# Patient Record
Sex: FEMALE | Race: OTHER | HISPANIC OR LATINO | URBAN - METROPOLITAN AREA
[De-identification: names, ages, dates, MRNs, and addresses within clinical notes are randomized per-mention and may not be internally consistent; named-entity substitution may affect disease eponyms.]

---

## 2017-04-01 ENCOUNTER — OUTPATIENT (OUTPATIENT)
Dept: OUTPATIENT SERVICES | Facility: HOSPITAL | Age: 62
LOS: 1 days | End: 2017-04-01
Payer: MEDICAID

## 2017-04-01 DIAGNOSIS — Z98.89 OTHER SPECIFIED POSTPROCEDURAL STATES: Chronic | ICD-10-CM

## 2017-04-10 DIAGNOSIS — R69 ILLNESS, UNSPECIFIED: ICD-10-CM

## 2017-05-01 PROCEDURE — G9001: CPT

## 2017-05-19 ENCOUNTER — APPOINTMENT (OUTPATIENT)
Dept: ORTHOPEDIC SURGERY | Facility: CLINIC | Age: 62
End: 2017-05-19

## 2017-08-07 ENCOUNTER — APPOINTMENT (OUTPATIENT)
Dept: ORTHOPEDIC SURGERY | Facility: CLINIC | Age: 62
End: 2017-08-07

## 2017-09-20 ENCOUNTER — APPOINTMENT (OUTPATIENT)
Dept: ORTHOPEDIC SURGERY | Facility: CLINIC | Age: 62
End: 2017-09-20
Payer: MEDICAID

## 2017-09-20 VITALS
SYSTOLIC BLOOD PRESSURE: 98 MMHG | BODY MASS INDEX: 45.75 KG/M2 | HEART RATE: 112 BPM | DIASTOLIC BLOOD PRESSURE: 70 MMHG | WEIGHT: 233 LBS | HEIGHT: 60 IN | TEMPERATURE: 97.7 F

## 2017-09-20 PROCEDURE — 99214 OFFICE O/P EST MOD 30 MIN: CPT

## 2017-10-04 ENCOUNTER — APPOINTMENT (OUTPATIENT)
Dept: MRI IMAGING | Facility: CLINIC | Age: 62
End: 2017-10-04
Payer: MEDICAID

## 2017-10-04 ENCOUNTER — OUTPATIENT (OUTPATIENT)
Dept: OUTPATIENT SERVICES | Facility: HOSPITAL | Age: 62
LOS: 1 days | End: 2017-10-04

## 2017-10-04 DIAGNOSIS — Z98.89 OTHER SPECIFIED POSTPROCEDURAL STATES: Chronic | ICD-10-CM

## 2017-10-04 DIAGNOSIS — M76.61 ACHILLES TENDINITIS, RIGHT LEG: ICD-10-CM

## 2017-10-04 PROCEDURE — 73721 MRI JNT OF LWR EXTRE W/O DYE: CPT | Mod: 26,RT

## 2017-10-09 ENCOUNTER — APPOINTMENT (OUTPATIENT)
Dept: ORTHOPEDIC SURGERY | Facility: CLINIC | Age: 62
End: 2017-10-09
Payer: MEDICAID

## 2017-10-09 PROCEDURE — 99214 OFFICE O/P EST MOD 30 MIN: CPT

## 2018-06-29 ENCOUNTER — APPOINTMENT (OUTPATIENT)
Dept: ORTHOPEDIC SURGERY | Facility: CLINIC | Age: 63
End: 2018-06-29
Payer: MEDICAID

## 2018-06-29 ENCOUNTER — RX RENEWAL (OUTPATIENT)
Age: 63
End: 2018-06-29

## 2018-06-29 DIAGNOSIS — M76.62 ACHILLES TENDINITIS, LEFT LEG: ICD-10-CM

## 2018-06-29 DIAGNOSIS — M72.2 PLANTAR FASCIAL FIBROMATOSIS: ICD-10-CM

## 2018-06-29 DIAGNOSIS — M76.61 ACHILLES TENDINITIS, RIGHT LEG: ICD-10-CM

## 2018-06-29 PROCEDURE — 99214 OFFICE O/P EST MOD 30 MIN: CPT

## 2018-07-10 ENCOUNTER — APPOINTMENT (OUTPATIENT)
Dept: RHEUMATOLOGY | Facility: CLINIC | Age: 63
End: 2018-07-10

## 2018-09-20 ENCOUNTER — INPATIENT (INPATIENT)
Facility: HOSPITAL | Age: 63
LOS: 2 days | Discharge: HOME | DRG: 392 | End: 2018-09-23
Attending: HOSPITALIST | Admitting: INTERNAL MEDICINE
Payer: COMMERCIAL

## 2018-09-20 VITALS
OXYGEN SATURATION: 88 % | HEART RATE: 88 BPM | TEMPERATURE: 99 F | RESPIRATION RATE: 16 BRPM | DIASTOLIC BLOOD PRESSURE: 55 MMHG | SYSTOLIC BLOOD PRESSURE: 96 MMHG

## 2018-09-20 DIAGNOSIS — R09.02 HYPOXEMIA: ICD-10-CM

## 2018-09-20 DIAGNOSIS — N17.9 ACUTE KIDNEY FAILURE, UNSPECIFIED: ICD-10-CM

## 2018-09-20 DIAGNOSIS — N19 UNSPECIFIED KIDNEY FAILURE: ICD-10-CM

## 2018-09-20 DIAGNOSIS — R09.89 OTHER SPECIFIED SYMPTOMS AND SIGNS INVOLVING THE CIRCULATORY AND RESPIRATORY SYSTEMS: ICD-10-CM

## 2018-09-20 DIAGNOSIS — F25.0 SCHIZOAFFECTIVE DISORDER, BIPOLAR TYPE: ICD-10-CM

## 2018-09-20 DIAGNOSIS — Z98.89 OTHER SPECIFIED POSTPROCEDURAL STATES: Chronic | ICD-10-CM

## 2018-09-20 DIAGNOSIS — E11.9 TYPE 2 DIABETES MELLITUS WITHOUT COMPLICATIONS: ICD-10-CM

## 2018-09-20 DIAGNOSIS — I10 ESSENTIAL (PRIMARY) HYPERTENSION: ICD-10-CM

## 2018-09-20 LAB
ALBUMIN SERPL ELPH-MCNC: 3.6 G/DL — SIGNIFICANT CHANGE UP (ref 3.3–5.2)
ALP SERPL-CCNC: 141 U/L — HIGH (ref 40–120)
ALT FLD-CCNC: 33 U/L — HIGH
AMPHET UR-MCNC: NEGATIVE — SIGNIFICANT CHANGE UP
ANION GAP SERPL CALC-SCNC: 16 MMOL/L — SIGNIFICANT CHANGE UP (ref 5–17)
APPEARANCE UR: ABNORMAL
AST SERPL-CCNC: 45 U/L — HIGH
BACTERIA # UR AUTO: ABNORMAL
BARBITURATES UR SCN-MCNC: NEGATIVE — SIGNIFICANT CHANGE UP
BENZODIAZ UR-MCNC: NEGATIVE — SIGNIFICANT CHANGE UP
BILIRUB SERPL-MCNC: 0.3 MG/DL — LOW (ref 0.4–2)
BILIRUB UR-MCNC: ABNORMAL
BUN SERPL-MCNC: 40 MG/DL — HIGH (ref 8–20)
CALCIUM SERPL-MCNC: 9.4 MG/DL — SIGNIFICANT CHANGE UP (ref 8.6–10.2)
CHLORIDE SERPL-SCNC: 98 MMOL/L — SIGNIFICANT CHANGE UP (ref 98–107)
CK MB CFR SERPL CALC: 8.4 NG/ML — HIGH (ref 0–6.7)
CK SERPL-CCNC: 1106 U/L — HIGH (ref 25–170)
CO2 SERPL-SCNC: 23 MMOL/L — SIGNIFICANT CHANGE UP (ref 22–29)
COCAINE METAB.OTHER UR-MCNC: NEGATIVE — SIGNIFICANT CHANGE UP
COLOR SPEC: YELLOW — SIGNIFICANT CHANGE UP
CREAT SERPL-MCNC: 4.1 MG/DL — HIGH (ref 0.5–1.3)
D DIMER BLD IA.RAPID-MCNC: 1011 NG/ML DDU — HIGH
DIFF PNL FLD: ABNORMAL
EPI CELLS # UR: SIGNIFICANT CHANGE UP
GLUCOSE BLDC GLUCOMTR-MCNC: 131 MG/DL — HIGH (ref 70–99)
GLUCOSE SERPL-MCNC: 116 MG/DL — HIGH (ref 70–115)
GLUCOSE UR QL: NEGATIVE MG/DL — SIGNIFICANT CHANGE UP
HCT VFR BLD CALC: 35.8 % — LOW (ref 37–47)
HGB BLD-MCNC: 11.6 G/DL — LOW (ref 12–16)
HYALINE CASTS # UR AUTO: ABNORMAL /LPF
KETONES UR-MCNC: ABNORMAL
LEUKOCYTE ESTERASE UR-ACNC: ABNORMAL
LIDOCAIN IGE QN: 10 U/L — LOW (ref 22–51)
MCHC RBC-ENTMCNC: 29 PG — SIGNIFICANT CHANGE UP (ref 27–31)
MCHC RBC-ENTMCNC: 32.4 G/DL — SIGNIFICANT CHANGE UP (ref 32–36)
MCV RBC AUTO: 89.5 FL — SIGNIFICANT CHANGE UP (ref 81–99)
METHADONE UR-MCNC: NEGATIVE — SIGNIFICANT CHANGE UP
NITRITE UR-MCNC: NEGATIVE — SIGNIFICANT CHANGE UP
OPIATES UR-MCNC: NEGATIVE — SIGNIFICANT CHANGE UP
PCP SPEC-MCNC: SIGNIFICANT CHANGE UP
PCP UR-MCNC: NEGATIVE — SIGNIFICANT CHANGE UP
PH UR: 5 — SIGNIFICANT CHANGE UP (ref 5–8)
PLATELET # BLD AUTO: 284 K/UL — SIGNIFICANT CHANGE UP (ref 150–400)
POTASSIUM SERPL-MCNC: 4.5 MMOL/L — SIGNIFICANT CHANGE UP (ref 3.5–5.3)
POTASSIUM SERPL-SCNC: 4.5 MMOL/L — SIGNIFICANT CHANGE UP (ref 3.5–5.3)
PROT SERPL-MCNC: 7.1 G/DL — SIGNIFICANT CHANGE UP (ref 6.6–8.7)
PROT UR-MCNC: 30 MG/DL
RBC # BLD: 4 M/UL — LOW (ref 4.4–5.2)
RBC # FLD: 14.4 % — SIGNIFICANT CHANGE UP (ref 11–15.6)
RBC CASTS # UR COMP ASSIST: ABNORMAL /HPF (ref 0–4)
SODIUM SERPL-SCNC: 137 MMOL/L — SIGNIFICANT CHANGE UP (ref 135–145)
SP GR SPEC: 1.01 — SIGNIFICANT CHANGE UP (ref 1.01–1.02)
THC UR QL: NEGATIVE — SIGNIFICANT CHANGE UP
TROPONIN T SERPL-MCNC: <0.01 NG/ML — SIGNIFICANT CHANGE UP (ref 0–0.06)
UROBILINOGEN FLD QL: 4 MG/DL
WBC # BLD: 12.5 K/UL — HIGH (ref 4.8–10.8)
WBC # FLD AUTO: 12.5 K/UL — HIGH (ref 4.8–10.8)
WBC UR QL: ABNORMAL

## 2018-09-20 PROCEDURE — 78582 LUNG VENTILAT&PERFUS IMAGING: CPT | Mod: 26

## 2018-09-20 PROCEDURE — 71045 X-RAY EXAM CHEST 1 VIEW: CPT | Mod: 26

## 2018-09-20 PROCEDURE — 76775 US EXAM ABDO BACK WALL LIM: CPT | Mod: 26

## 2018-09-20 PROCEDURE — 99285 EMERGENCY DEPT VISIT HI MDM: CPT

## 2018-09-20 PROCEDURE — 99223 1ST HOSP IP/OBS HIGH 75: CPT

## 2018-09-20 PROCEDURE — 93010 ELECTROCARDIOGRAM REPORT: CPT

## 2018-09-20 PROCEDURE — 93970 EXTREMITY STUDY: CPT | Mod: 26

## 2018-09-20 RX ORDER — DEXTROSE 50 % IN WATER 50 %
15 SYRINGE (ML) INTRAVENOUS ONCE
Qty: 0 | Refills: 0 | Status: DISCONTINUED | OUTPATIENT
Start: 2018-09-20 | End: 2018-09-23

## 2018-09-20 RX ORDER — ALBUTEROL 90 UG/1
2.5 AEROSOL, METERED ORAL EVERY 6 HOURS
Qty: 0 | Refills: 0 | Status: DISCONTINUED | OUTPATIENT
Start: 2018-09-20 | End: 2018-09-23

## 2018-09-20 RX ORDER — DEXTROSE 50 % IN WATER 50 %
12.5 SYRINGE (ML) INTRAVENOUS ONCE
Qty: 0 | Refills: 0 | Status: DISCONTINUED | OUTPATIENT
Start: 2018-09-20 | End: 2018-09-23

## 2018-09-20 RX ORDER — INSULIN LISPRO 100/ML
VIAL (ML) SUBCUTANEOUS
Qty: 0 | Refills: 0 | Status: DISCONTINUED | OUTPATIENT
Start: 2018-09-20 | End: 2018-09-23

## 2018-09-20 RX ORDER — SODIUM CHLORIDE 9 MG/ML
1000 INJECTION INTRAMUSCULAR; INTRAVENOUS; SUBCUTANEOUS
Qty: 0 | Refills: 0 | Status: DISCONTINUED | OUTPATIENT
Start: 2018-09-20 | End: 2018-09-22

## 2018-09-20 RX ORDER — GLUCAGON INJECTION, SOLUTION 0.5 MG/.1ML
1 INJECTION, SOLUTION SUBCUTANEOUS ONCE
Qty: 0 | Refills: 0 | Status: DISCONTINUED | OUTPATIENT
Start: 2018-09-20 | End: 2018-09-23

## 2018-09-20 RX ORDER — SODIUM CHLORIDE 9 MG/ML
3 INJECTION INTRAMUSCULAR; INTRAVENOUS; SUBCUTANEOUS ONCE
Qty: 0 | Refills: 0 | Status: COMPLETED | OUTPATIENT
Start: 2018-09-20 | End: 2018-09-20

## 2018-09-20 RX ORDER — DEXTROSE 50 % IN WATER 50 %
25 SYRINGE (ML) INTRAVENOUS ONCE
Qty: 0 | Refills: 0 | Status: DISCONTINUED | OUTPATIENT
Start: 2018-09-20 | End: 2018-09-23

## 2018-09-20 RX ORDER — AMLODIPINE BESYLATE 2.5 MG/1
2.5 TABLET ORAL DAILY
Qty: 0 | Refills: 0 | Status: DISCONTINUED | OUTPATIENT
Start: 2018-09-20 | End: 2018-09-23

## 2018-09-20 RX ORDER — SODIUM CHLORIDE 9 MG/ML
1000 INJECTION, SOLUTION INTRAVENOUS
Qty: 0 | Refills: 0 | Status: DISCONTINUED | OUTPATIENT
Start: 2018-09-20 | End: 2018-09-23

## 2018-09-20 RX ORDER — HEPARIN SODIUM 5000 [USP'U]/ML
5000 INJECTION INTRAVENOUS; SUBCUTANEOUS EVERY 8 HOURS
Qty: 0 | Refills: 0 | Status: DISCONTINUED | OUTPATIENT
Start: 2018-09-20 | End: 2018-09-23

## 2018-09-20 RX ADMIN — SODIUM CHLORIDE 3 MILLILITER(S): 9 INJECTION INTRAMUSCULAR; INTRAVENOUS; SUBCUTANEOUS at 14:22

## 2018-09-20 RX ADMIN — SODIUM CHLORIDE 100 MILLILITER(S): 9 INJECTION INTRAMUSCULAR; INTRAVENOUS; SUBCUTANEOUS at 22:12

## 2018-09-20 RX ADMIN — HEPARIN SODIUM 5000 UNIT(S): 5000 INJECTION INTRAVENOUS; SUBCUTANEOUS at 22:16

## 2018-09-20 NOTE — H&P ADULT - HISTORY OF PRESENT ILLNESS
62 years old female with PMH of HTN, DM and schizoaffective disorder brought in to the ER for the evaluation of low O2 sat. Reportedly patient was not feeling well for the past several days. She called EMS who found her with low O2 sat and was transported to the ER. Patient reports seeing people in her house. She is poor historian and is unable to provide any more information. Denies any fever, chills, nausea, vomiting, chest pain, SOB, diarrhea or constipation. 62 years old female with PMH of HTN, DM and schizoaffective disorder brought in to the ER for the evaluation of low O2 sat. Reportedly patient was not feeling well for the past several days. Her   visiting nurse noticed that she is more confused than her baseline. She called EMS who found her with low O2 sat (88%) and was transported to the ER. Patient reports seeing people in her house. She is poor historian and is unable to provide any more information. Denies any fever, chills, nausea, vomiting, chest pain, SOB, diarrhea or constipation.

## 2018-09-20 NOTE — ED PROVIDER NOTE - OBJECTIVE STATEMENT
63 yo female, PMH schizoaffective disorder bipolar type, diabetes, and hypertension is BIBEMS for low oxygen saturation. Patient states that she called EMS because she was "feeling sick." Patient cannot give more information about her condition or the factors that led her to call EMS.   As per patient's emergency contact who was reached by phone: He states that he saw the patient x5 days ago and she was upset due to the recent loss of her parent.

## 2018-09-20 NOTE — ED ADULT NURSE NOTE - NSIMPLEMENTINTERV_GEN_ALL_ED
Implemented All Fall with Harm Risk Interventions:  [place] to call system. Call bell, personal items and telephone within reach. Instruct patient to call for assistance. Room bathroom lighting operational. Non-slip footwear when patient is off stretcher. Physically safe environment: no spills, clutter or unnecessary equipment. Stretcher in lowest position, wheels locked, appropriate side rails in place. Provide visual cue, wrist band, yellow gown, etc. Monitor gait and stability. Monitor for mental status changes and reorient to person, place, and time. Review medications for side effects contributing to fall risk. Reinforce activity limits and safety measures with patient and family. Provide visual clues: red socks.

## 2018-09-20 NOTE — ED ADULT NURSE NOTE - OBJECTIVE STATEMENT
62 year old female a&ox2, disoriented to date. Patient states she came to hospital because " she was sick " unable to answer questions appropriately.

## 2018-09-20 NOTE — ED PROVIDER NOTE - MEDICAL DECISION MAKING DETAILS
63 yo F, PMH DM, HTN, schizoaffective disorder, BIBEMS d/t low oxygen saturation. P/w AMS. Will complete w/u, including CBC, CXR, UA, tox, then reassess.

## 2018-09-20 NOTE — ED ADULT TRIAGE NOTE - CHIEF COMPLAINT QUOTE
pt biba from home, ems was called by visiting nurse for increased altered mental status, pt is awake but disoriented, difficulty to ascertain information, poor hygiene, no distress, voices no complaints, 88% on RA

## 2018-09-20 NOTE — ED PROVIDER NOTE - PROGRESS NOTE DETAILS
EVAL SIG D D MEGHANA OVER 1000. VQ SCAN ORDERED BC BUN AND CRE ELEVATED TOO MUCH TO DO CTA. PT STATES DR MENON IN [facility] TOLD HER SHE HAD FLUID ON HER LUNGS

## 2018-09-20 NOTE — H&P ADULT - RS GEN PE MLT RESP DETAILS PC
respirations non-labored/good air movement/diminished breath sounds, L/breath sounds equal/diminished breath sounds, R/no rhonchi/no rales

## 2018-09-20 NOTE — H&P ADULT - PROBLEM SELECTOR PLAN 3
BP in normal range.  patient doesn't know her medications and no family member around.  started on low dose norvasc.  monitor BP.

## 2018-09-20 NOTE — ED ADULT NURSE REASSESSMENT NOTE - NS ED NURSE REASSESS COMMENT FT1
patient awake, alert, patient remains disoriented to why she is here but knows where she is. patient informed on plan of care. will continue to monitor.

## 2018-09-20 NOTE — ED PROVIDER NOTE - CONSTITUTIONAL, MLM
normal... (+)Obese.  Awake, but seems tired. Not cooperative with exam, does not answer questions appropriately.

## 2018-09-20 NOTE — CONSULT NOTE ADULT - SUBJECTIVE AND OBJECTIVE BOX
Patient is a 62y old  Female who presents with a chief complaint of  " feeling ill ".    HPI:   Pt felt ill and came to ER she denies knowledge of any prior renal problems but does have dysuria recently. Hx of sleep apnea and uses 02 at home she states. Prior cardiac stent .    PAST MEDICAL & SURGICAL HISTORY:  Diabetes  Hypertension  Schizoaffective disorder, bipolar type  H/O cardiac catheterization: radially accessed  S/P    DM 2, MO, hypothyroidism, prior DVT and IVC filter; Cholecystectomy    FAMILY HISTORY:  No pertinent family history in first degree relatives  [place]    Social History: Prior smoker.    MEDICATIONS  (STANDING):    MEDICATIONS  (PRN):   Meds reviewed    Allergies    No Known Allergies          REVIEW OF SYSTEMS:    CONSTITUTIONAL:   feels weak  EYES: No eye pain, visual disturbances, or discharge  ENMT:  No difficulty hearing, tinnitus, vertigo; No sinus or throat pain  NECK: No pain or stiffness  BREASTS: No pain, masses, or nipple discharge  RESPIRATORY: sob  CARDIOVASCULAR: No chest pain, palpitations, dizziness,   GASTROINTESTINAL: No abdominal or epigastric pain. No nausea, vomiting, or hematemesis; No diarrhea or constipation.   GENITOURINARY: + dysuria, frequency,  no hematuria, or incontinence  NEUROLOGICAL: No headaches, memory loss, loss of strength, numbness, or tremors  SKIN: Neg  LYMPH NODES: No enlarged glands  ENDOCRINE: No heat or cold intolerance; No hair loss  MUSCULOSKELETAL: Neg  PSYCHIATRIC: No depression, anxiety, mood swings, or difficulty sleeping  HEME/LYMPH: No easy bruising, or bleeding gums  ALLERY AND IMMUNOLOGIC: No hives or eczema      Vital Signs Last 24 Hrs  T(C): 37.1 (20 Sep 2018 17:36), Max: 37.1 (20 Sep 2018 11:45)  T(F): 98.8 (20 Sep 2018 17:36), Max: 98.8 (20 Sep 2018 11:45)  HR: 84 (20 Sep 2018 17:36) (84 - 88)  BP: 114/68 (20 Sep 2018 17:36) (96/55 - 114/68)  BP(mean): --  RR: 18 (20 Sep 2018 17:36) (16 - 18)  SpO2: 97% (20 Sep 2018 17:36) (88% - 97%)       PHYSICAL EXAM:    GENERAL: appears chronically ill, oriented.  HEAD:  Atraumatic, Normocephalic  EYES: EOMI, PERRLA, conjunctiva and sclera clear  ENMT: No tonsillar erythema, exudates, or enlargement; Moist mucous membranes,   NECK: Supple, neck  veins not full  NERVOUS SYSTEM:  Alert & Oriented , ; Motor Strength wnl upper and lower extremities;   CHEST/LUNG: Clear to percussion bilaterally; No rales, rhonchi, wheezing, or rubs  HEART: Regular rate and rhythm; No murmurs, rubs, or gallops  ABDOMEN: Soft, Nontender, Nondistended; Bowel sounds present, obese , scars lower  EXTREMITIES:  Obese  LYMPH: No lymphadenopathy noted  SKIN: No rashes or lesions, pale   neg    LABS:                        11.6   12.5  )-----------( 284      ( 20 Sep 2018 14:33 )             35.8         137  |  98  |  40.0<H>  ----------------------------<  116<H>  4.5   |  23.0  |  4.10<H>    Ca    9.4      20 Sep 2018 14:33    TPro  7.1  /  Alb  3.6  /  TBili  0.3<L>  /  DBili  x   /  AST  45<H>  /  ALT  33<H>  /  AlkPhos  141<H>        Urinalysis Basic - ( 20 Sep 2018 17:43 )    Color: Yellow / Appearance: Cloudy / S.015 / pH: x  Gluc: x / Ketone: Trace  / Bili: Small / Urobili: 4 mg/dL   Blood: x / Protein: 30 mg/dL / Nitrite: Negative   Leuk Esterase: Moderate / RBC: 11-25 /HPF / WBC 11-25   Sq Epi: x / Non Sq Epi: Occasional / Bacteria: Moderate              RADIOLOGY & ADDITIONAL TESTS:

## 2018-09-20 NOTE — H&P ADULT - PROBLEM SELECTOR PLAN 1
baseline cr not known.  renal US ordered.  started on NS at 100 ml/hr.  renally dosed medications.  follow up BMP in am  nephrology consult

## 2018-09-20 NOTE — H&P ADULT - PROBLEM SELECTOR PLAN 2
current O2 sat >90 on 2 L  ABG pending.  elevated d-dimers.  VQ scan low probability   lower extremity venous doppler pending.  prn albuterol  titrate fio2 to sat of 90

## 2018-09-20 NOTE — H&P ADULT - PROBLEM SELECTOR PLAN 5
pt doesn't know her medications.  psych consult called.  day time attending to get the list of medications from .

## 2018-09-20 NOTE — ED ADULT NURSE REASSESSMENT NOTE - NS ED NURSE REASSESS COMMENT FT1
Pt in no acute distress, cooperative but requires frequent redirection to hold still during IV placement, very dry mucous membranes noted.

## 2018-09-20 NOTE — H&P ADULT - ASSESSMENT
62 years old female with PMH of HTN, DM and schizoaffective disorder brought in to the ER for the evaluation of low O2 sat. Reportedly patient was not feeling well for the past several days. She called EMS who found her with low O2 sat and was transported to the ER. Patient reports seeing people in her house. She is poor historian and is unable to provide any more information. Denies any fever, chills, nausea, vomiting, chest pain, SOB, diarrhea or constipation.

## 2018-09-21 LAB
ALBUMIN SERPL ELPH-MCNC: 2.7 G/DL — LOW (ref 3.3–5.2)
ALP SERPL-CCNC: 157 U/L — HIGH (ref 40–120)
ALT FLD-CCNC: 33 U/L — HIGH
ANION GAP SERPL CALC-SCNC: 14 MMOL/L — SIGNIFICANT CHANGE UP (ref 5–17)
AST SERPL-CCNC: 48 U/L — HIGH
BILIRUB SERPL-MCNC: 0.3 MG/DL — LOW (ref 0.4–2)
BUN SERPL-MCNC: 33 MG/DL — HIGH (ref 8–20)
CALCIUM SERPL-MCNC: 9.3 MG/DL — SIGNIFICANT CHANGE UP (ref 8.6–10.2)
CHLORIDE SERPL-SCNC: 100 MMOL/L — SIGNIFICANT CHANGE UP (ref 98–107)
CK MB CFR SERPL CALC: 6.4 NG/ML — SIGNIFICANT CHANGE UP (ref 0–6.7)
CK SERPL-CCNC: 924 U/L — HIGH (ref 25–170)
CO2 SERPL-SCNC: 21 MMOL/L — LOW (ref 22–29)
CREAT SERPL-MCNC: 2.15 MG/DL — HIGH (ref 0.5–1.3)
GLUCOSE BLDC GLUCOMTR-MCNC: 102 MG/DL — HIGH (ref 70–99)
GLUCOSE BLDC GLUCOMTR-MCNC: 139 MG/DL — HIGH (ref 70–99)
GLUCOSE BLDC GLUCOMTR-MCNC: 98 MG/DL — SIGNIFICANT CHANGE UP (ref 70–99)
GLUCOSE SERPL-MCNC: 98 MG/DL — SIGNIFICANT CHANGE UP (ref 70–115)
HBA1C BLD-MCNC: 5.6 % — SIGNIFICANT CHANGE UP (ref 4–5.6)
LITHIUM SERPL-MCNC: <.1 MMOL/L — LOW (ref 0.5–1.5)
PHOSPHATE SERPL-MCNC: 3.3 MG/DL — SIGNIFICANT CHANGE UP (ref 2.4–4.7)
POTASSIUM SERPL-MCNC: 4.8 MMOL/L — SIGNIFICANT CHANGE UP (ref 3.5–5.3)
POTASSIUM SERPL-SCNC: 4.8 MMOL/L — SIGNIFICANT CHANGE UP (ref 3.5–5.3)
PROT SERPL-MCNC: 7.2 G/DL — SIGNIFICANT CHANGE UP (ref 6.6–8.7)
PTH-INTACT FLD-MCNC: 32 PG/ML — SIGNIFICANT CHANGE UP (ref 15–65)
SODIUM SERPL-SCNC: 135 MMOL/L — SIGNIFICANT CHANGE UP (ref 135–145)

## 2018-09-21 PROCEDURE — 99233 SBSQ HOSP IP/OBS HIGH 50: CPT

## 2018-09-21 PROCEDURE — 99223 1ST HOSP IP/OBS HIGH 75: CPT

## 2018-09-21 RX ADMIN — HEPARIN SODIUM 5000 UNIT(S): 5000 INJECTION INTRAVENOUS; SUBCUTANEOUS at 22:45

## 2018-09-21 RX ADMIN — AMLODIPINE BESYLATE 2.5 MILLIGRAM(S): 2.5 TABLET ORAL at 06:08

## 2018-09-21 RX ADMIN — HEPARIN SODIUM 5000 UNIT(S): 5000 INJECTION INTRAVENOUS; SUBCUTANEOUS at 13:21

## 2018-09-21 RX ADMIN — HEPARIN SODIUM 5000 UNIT(S): 5000 INJECTION INTRAVENOUS; SUBCUTANEOUS at 06:08

## 2018-09-21 NOTE — BEHAVIORAL HEALTH ASSESSMENT NOTE - HPI (INCLUDE ILLNESS QUALITY, SEVERITY, DURATION, TIMING, CONTEXT, MODIFYING FACTORS, ASSOCIATED SIGNS AND SYMPTOMS)
62 years old female with PMH of HTN, DM and schizoaffective disorder admitted with AMS and low O2 sat. Pt has long chronic hx of schizophrenia for years. She is followed by the [facility] ACT team [facility] for at least 8 years with no hospitalizations during that time. She lives alone and has been able to care for herself.   Patient reports compliance with medications; no change in sleeping or eating pattern. She does endorse VH of a man in a cape in her home; also feels someone is pushing her at times, however, is able to self soothe and knows it is not real. She feels like a "thunderstorm is hitting me" and she doesn't know what is wrong physically. She speaks circumstantially and tangentially. She is well oriented and denies SI/HI/AH. She reports that these symptoms have been persistent for years despite medication. She also expresses mild depression due to current circumstances.   TC to ACT team west for medication reconciliation and evaluation of patients baseline; message left for , awaiting return call from RN.

## 2018-09-21 NOTE — BEHAVIORAL HEALTH ASSESSMENT NOTE - SUMMARY
62f admitted for low O2 sat; chronic hx of schizophrenia and followed by [facility] ACT team for at least 8 yrs, maintained at home. No si/hi but experiencing VH when at home, which appears to be chronic symptom. Pt has been compliant and cooperative.     Plan:  1) awaiting return call from [facility] to reconcile medications and discuss baseline functioning

## 2018-09-21 NOTE — BEHAVIORAL HEALTH ASSESSMENT NOTE - RISK ASSESSMENT
moderate - pt without si/hi, has good community support without recent hospitalization, however, suffers chronic mental illness and having acute medical issue

## 2018-09-22 LAB
-  AMIKACIN: SIGNIFICANT CHANGE UP
-  AMPICILLIN/SULBACTAM: SIGNIFICANT CHANGE UP
-  AMPICILLIN: SIGNIFICANT CHANGE UP
-  AZTREONAM: SIGNIFICANT CHANGE UP
-  CEFAZOLIN: SIGNIFICANT CHANGE UP
-  CEFEPIME: SIGNIFICANT CHANGE UP
-  CEFOXITIN: SIGNIFICANT CHANGE UP
-  CEFTRIAXONE: SIGNIFICANT CHANGE UP
-  CIPROFLOXACIN: SIGNIFICANT CHANGE UP
-  ERTAPENEM: SIGNIFICANT CHANGE UP
-  GENTAMICIN: SIGNIFICANT CHANGE UP
-  IMIPENEM: SIGNIFICANT CHANGE UP
-  LEVOFLOXACIN: SIGNIFICANT CHANGE UP
-  MEROPENEM: SIGNIFICANT CHANGE UP
-  NITROFURANTOIN: SIGNIFICANT CHANGE UP
-  PIPERACILLIN/TAZOBACTAM: SIGNIFICANT CHANGE UP
-  TIGECYCLINE: SIGNIFICANT CHANGE UP
-  TOBRAMYCIN: SIGNIFICANT CHANGE UP
-  TRIMETHOPRIM/SULFAMETHOXAZOLE: SIGNIFICANT CHANGE UP
ALBUMIN SERPL ELPH-MCNC: 3.3 G/DL — SIGNIFICANT CHANGE UP (ref 3.3–5.2)
ALP SERPL-CCNC: 139 U/L — HIGH (ref 40–120)
ALT FLD-CCNC: 35 U/L — HIGH
ANION GAP SERPL CALC-SCNC: 11 MMOL/L — SIGNIFICANT CHANGE UP (ref 5–17)
AST SERPL-CCNC: 38 U/L — HIGH
BILIRUB SERPL-MCNC: 0.3 MG/DL — LOW (ref 0.4–2)
BUN SERPL-MCNC: 20 MG/DL — SIGNIFICANT CHANGE UP (ref 8–20)
CALCIUM SERPL-MCNC: 8.9 MG/DL — SIGNIFICANT CHANGE UP (ref 8.4–10.5)
CALCIUM SERPL-MCNC: 9.1 MG/DL — SIGNIFICANT CHANGE UP (ref 8.6–10.2)
CHLORIDE SERPL-SCNC: 100 MMOL/L — SIGNIFICANT CHANGE UP (ref 98–107)
CO2 SERPL-SCNC: 25 MMOL/L — SIGNIFICANT CHANGE UP (ref 22–29)
CREAT SERPL-MCNC: 1.05 MG/DL — SIGNIFICANT CHANGE UP (ref 0.5–1.3)
CULTURE RESULTS: SIGNIFICANT CHANGE UP
GLUCOSE BLDC GLUCOMTR-MCNC: 109 MG/DL — HIGH (ref 70–99)
GLUCOSE BLDC GLUCOMTR-MCNC: 119 MG/DL — HIGH (ref 70–99)
GLUCOSE BLDC GLUCOMTR-MCNC: 125 MG/DL — HIGH (ref 70–99)
GLUCOSE BLDC GLUCOMTR-MCNC: 128 MG/DL — HIGH (ref 70–99)
GLUCOSE SERPL-MCNC: 105 MG/DL — SIGNIFICANT CHANGE UP (ref 70–115)
HCT VFR BLD CALC: 36.2 % — LOW (ref 37–47)
HGB BLD-MCNC: 11.6 G/DL — LOW (ref 12–16)
MCHC RBC-ENTMCNC: 28.7 PG — SIGNIFICANT CHANGE UP (ref 27–31)
MCHC RBC-ENTMCNC: 32 G/DL — SIGNIFICANT CHANGE UP (ref 32–36)
MCV RBC AUTO: 89.6 FL — SIGNIFICANT CHANGE UP (ref 81–99)
METHOD TYPE: SIGNIFICANT CHANGE UP
ORGANISM # SPEC MICROSCOPIC CNT: SIGNIFICANT CHANGE UP
ORGANISM # SPEC MICROSCOPIC CNT: SIGNIFICANT CHANGE UP
PLATELET # BLD AUTO: 314 K/UL — SIGNIFICANT CHANGE UP (ref 150–400)
POTASSIUM SERPL-MCNC: 4.7 MMOL/L — SIGNIFICANT CHANGE UP (ref 3.5–5.3)
POTASSIUM SERPL-SCNC: 4.7 MMOL/L — SIGNIFICANT CHANGE UP (ref 3.5–5.3)
PROT SERPL-MCNC: 7.1 G/DL — SIGNIFICANT CHANGE UP (ref 6.6–8.7)
RBC # BLD: 4.04 M/UL — LOW (ref 4.4–5.2)
RBC # FLD: 14 % — SIGNIFICANT CHANGE UP (ref 11–15.6)
SODIUM SERPL-SCNC: 136 MMOL/L — SIGNIFICANT CHANGE UP (ref 135–145)
SPECIMEN SOURCE: SIGNIFICANT CHANGE UP
WBC # BLD: 8.7 K/UL — SIGNIFICANT CHANGE UP (ref 4.8–10.8)
WBC # FLD AUTO: 8.7 K/UL — SIGNIFICANT CHANGE UP (ref 4.8–10.8)

## 2018-09-22 PROCEDURE — 99233 SBSQ HOSP IP/OBS HIGH 50: CPT

## 2018-09-22 PROCEDURE — 99232 SBSQ HOSP IP/OBS MODERATE 35: CPT

## 2018-09-22 RX ADMIN — HEPARIN SODIUM 5000 UNIT(S): 5000 INJECTION INTRAVENOUS; SUBCUTANEOUS at 14:36

## 2018-09-22 RX ADMIN — HEPARIN SODIUM 5000 UNIT(S): 5000 INJECTION INTRAVENOUS; SUBCUTANEOUS at 23:08

## 2018-09-22 RX ADMIN — HEPARIN SODIUM 5000 UNIT(S): 5000 INJECTION INTRAVENOUS; SUBCUTANEOUS at 06:49

## 2018-09-22 RX ADMIN — AMLODIPINE BESYLATE 2.5 MILLIGRAM(S): 2.5 TABLET ORAL at 06:49

## 2018-09-22 NOTE — PROGRESS NOTE BEHAVIORAL HEALTH - RISK ASSESSMENT
moderate - Pt without current si/hi; has good community support without; recent psychiatric hospitalization; without recent suicidal attempts.

## 2018-09-22 NOTE — PROGRESS NOTE BEHAVIORAL HEALTH - SUMMARY
63 y/o, , female, mother of two adults (ages 38 and 39); non-caregiver; with hx of chronic schizophrenia; followed by [facility] team for at least 8 yrs., maintained at home; has the ACT team in place; with a history of inpatient psychiatric admissions; admitted for hypoxia.    Patient denies current suicidality, homicidality, jamie, psychosis, and none noted. She is calm and cooperative; without acute mood dysregulations. Patient has not current acute psychiatric symptomatology to warrant a higher level of care.

## 2018-09-22 NOTE — PROGRESS NOTE BEHAVIORAL HEALTH - NSBHFUPINTERVALHXFT_PSY_A_CORE
Patient reports her current mood as happy, but states sometimes she gets sad because of loosing her mother who  in 2016. She states  she feels better since being hospitalized, and notes "I'm trying to loose the weight". She states her appetite is good; she denies current suicidal thoughts or homicidal thoughts and cites her children as protective factors. She reports her current sleep as poor, which she attributes to being in the hospital and not having her sleep apnea machine. She denies current psychotic symptoms including current auditory or visual hallucinations; denies any paranoia, delusions and none noted. Patient states she was told she will most likely be discharged home tomorrow, and is looking forward to going back home.

## 2018-09-22 NOTE — PROGRESS NOTE BEHAVIORAL HEALTH - NSBHCONSULTFOLLOWAFTERCARE_PSY_A_CORE FT
Follow-up with the [facility] at the [facility] [[email or web address]] for psychiatric services.

## 2018-09-22 NOTE — PROGRESS NOTE BEHAVIORAL HEALTH - NSBHFUPIPCHARTREVFT_PSY_A_CORE
This is a 63 y/o; single, non-caregiver; with a history of chronic schizophrenia; followed by [facility] team for at least 8 yrs,s to be chronic symptom. With a medical history of DM, HTN, Asthma, GERD, Hypothyroidism, hypercholesterolemia, Iron Deficiency, Vit. D deficiency. Per report, pt has been compliant with medical treatment. She has been calm and cooperative. Admitted for low O2 saturation. Psychiatric evaluation was requested due to history of schizophrenia and medication management.

## 2018-09-23 ENCOUNTER — TRANSCRIPTION ENCOUNTER (OUTPATIENT)
Age: 63
End: 2018-09-23

## 2018-09-23 VITALS
OXYGEN SATURATION: 94 % | SYSTOLIC BLOOD PRESSURE: 122 MMHG | DIASTOLIC BLOOD PRESSURE: 70 MMHG | RESPIRATION RATE: 16 BRPM | TEMPERATURE: 98 F | HEART RATE: 79 BPM

## 2018-09-23 LAB
ANION GAP SERPL CALC-SCNC: 12 MMOL/L — SIGNIFICANT CHANGE UP (ref 5–17)
BUN SERPL-MCNC: 14 MG/DL — SIGNIFICANT CHANGE UP (ref 8–20)
CALCIUM SERPL-MCNC: 9.7 MG/DL — SIGNIFICANT CHANGE UP (ref 8.6–10.2)
CHLORIDE SERPL-SCNC: 96 MMOL/L — LOW (ref 98–107)
CO2 SERPL-SCNC: 28 MMOL/L — SIGNIFICANT CHANGE UP (ref 22–29)
CREAT SERPL-MCNC: 0.72 MG/DL — SIGNIFICANT CHANGE UP (ref 0.5–1.3)
GLUCOSE BLDC GLUCOMTR-MCNC: 104 MG/DL — HIGH (ref 70–99)
GLUCOSE BLDC GLUCOMTR-MCNC: 112 MG/DL — HIGH (ref 70–99)
GLUCOSE SERPL-MCNC: 95 MG/DL — SIGNIFICANT CHANGE UP (ref 70–115)
HCT VFR BLD CALC: 38.5 % — SIGNIFICANT CHANGE UP (ref 37–47)
HGB BLD-MCNC: 12.6 G/DL — SIGNIFICANT CHANGE UP (ref 12–16)
MAGNESIUM SERPL-MCNC: 2.2 MG/DL — SIGNIFICANT CHANGE UP (ref 1.6–2.6)
MCHC RBC-ENTMCNC: 28.8 PG — SIGNIFICANT CHANGE UP (ref 27–31)
MCHC RBC-ENTMCNC: 32.7 G/DL — SIGNIFICANT CHANGE UP (ref 32–36)
MCV RBC AUTO: 87.9 FL — SIGNIFICANT CHANGE UP (ref 81–99)
PHOSPHATE SERPL-MCNC: 3.5 MG/DL — SIGNIFICANT CHANGE UP (ref 2.4–4.7)
PLATELET # BLD AUTO: 372 K/UL — SIGNIFICANT CHANGE UP (ref 150–400)
POTASSIUM SERPL-MCNC: 5 MMOL/L — SIGNIFICANT CHANGE UP (ref 3.5–5.3)
POTASSIUM SERPL-SCNC: 5 MMOL/L — SIGNIFICANT CHANGE UP (ref 3.5–5.3)
RBC # BLD: 4.38 M/UL — LOW (ref 4.4–5.2)
RBC # FLD: 13.7 % — SIGNIFICANT CHANGE UP (ref 11–15.6)
SODIUM SERPL-SCNC: 136 MMOL/L — SIGNIFICANT CHANGE UP (ref 135–145)
WBC # BLD: 9.7 K/UL — SIGNIFICANT CHANGE UP (ref 4.8–10.8)
WBC # FLD AUTO: 9.7 K/UL — SIGNIFICANT CHANGE UP (ref 4.8–10.8)

## 2018-09-23 PROCEDURE — 93005 ELECTROCARDIOGRAM TRACING: CPT

## 2018-09-23 PROCEDURE — 94660 CPAP INITIATION&MGMT: CPT

## 2018-09-23 PROCEDURE — 85379 FIBRIN DEGRADATION QUANT: CPT

## 2018-09-23 PROCEDURE — 83036 HEMOGLOBIN GLYCOSYLATED A1C: CPT

## 2018-09-23 PROCEDURE — 78582 LUNG VENTILAT&PERFUS IMAGING: CPT

## 2018-09-23 PROCEDURE — 36415 COLL VENOUS BLD VENIPUNCTURE: CPT

## 2018-09-23 PROCEDURE — A9540: CPT

## 2018-09-23 PROCEDURE — 71045 X-RAY EXAM CHEST 1 VIEW: CPT

## 2018-09-23 PROCEDURE — 82553 CREATINE MB FRACTION: CPT

## 2018-09-23 PROCEDURE — 93970 EXTREMITY STUDY: CPT

## 2018-09-23 PROCEDURE — 84484 ASSAY OF TROPONIN QUANT: CPT

## 2018-09-23 PROCEDURE — 82962 GLUCOSE BLOOD TEST: CPT

## 2018-09-23 PROCEDURE — 82803 BLOOD GASES ANY COMBINATION: CPT

## 2018-09-23 PROCEDURE — 80307 DRUG TEST PRSMV CHEM ANLYZR: CPT

## 2018-09-23 PROCEDURE — 86038 ANTINUCLEAR ANTIBODIES: CPT

## 2018-09-23 PROCEDURE — 99285 EMERGENCY DEPT VISIT HI MDM: CPT

## 2018-09-23 PROCEDURE — 81001 URINALYSIS AUTO W/SCOPE: CPT

## 2018-09-23 PROCEDURE — 80178 ASSAY OF LITHIUM: CPT

## 2018-09-23 PROCEDURE — 87086 URINE CULTURE/COLONY COUNT: CPT

## 2018-09-23 PROCEDURE — 87186 SC STD MICRODIL/AGAR DIL: CPT

## 2018-09-23 PROCEDURE — 83970 ASSAY OF PARATHORMONE: CPT

## 2018-09-23 PROCEDURE — 82550 ASSAY OF CK (CPK): CPT

## 2018-09-23 PROCEDURE — A9567: CPT

## 2018-09-23 PROCEDURE — 85027 COMPLETE CBC AUTOMATED: CPT

## 2018-09-23 PROCEDURE — 82310 ASSAY OF CALCIUM: CPT

## 2018-09-23 PROCEDURE — 80053 COMPREHEN METABOLIC PANEL: CPT

## 2018-09-23 PROCEDURE — 84100 ASSAY OF PHOSPHORUS: CPT

## 2018-09-23 PROCEDURE — 76775 US EXAM ABDO BACK WALL LIM: CPT

## 2018-09-23 PROCEDURE — 83690 ASSAY OF LIPASE: CPT

## 2018-09-23 PROCEDURE — 99239 HOSP IP/OBS DSCHRG MGMT >30: CPT

## 2018-09-23 PROCEDURE — 83735 ASSAY OF MAGNESIUM: CPT

## 2018-09-23 PROCEDURE — 90686 IIV4 VACC NO PRSV 0.5 ML IM: CPT

## 2018-09-23 PROCEDURE — 80048 BASIC METABOLIC PNL TOTAL CA: CPT

## 2018-09-23 RX ORDER — PANTOPRAZOLE SODIUM 20 MG/1
40 TABLET, DELAYED RELEASE ORAL
Qty: 0 | Refills: 0 | Status: DISCONTINUED | OUTPATIENT
Start: 2018-09-23 | End: 2018-09-23

## 2018-09-23 RX ORDER — INFLUENZA VIRUS VACCINE 15; 15; 15; 15 UG/.5ML; UG/.5ML; UG/.5ML; UG/.5ML
0.5 SUSPENSION INTRAMUSCULAR ONCE
Qty: 0 | Refills: 0 | Status: COMPLETED | OUTPATIENT
Start: 2018-09-23 | End: 2018-09-23

## 2018-09-23 RX ORDER — AMLODIPINE BESYLATE 2.5 MG/1
1 TABLET ORAL
Qty: 30 | Refills: 0
Start: 2018-09-23 | End: 2018-10-22

## 2018-09-23 RX ORDER — PANTOPRAZOLE SODIUM 20 MG/1
1 TABLET, DELAYED RELEASE ORAL
Qty: 30 | Refills: 0
Start: 2018-09-23 | End: 2018-10-22

## 2018-09-23 RX ADMIN — AMLODIPINE BESYLATE 2.5 MILLIGRAM(S): 2.5 TABLET ORAL at 05:34

## 2018-09-23 RX ADMIN — INFLUENZA VIRUS VACCINE 0.5 MILLILITER(S): 15; 15; 15; 15 SUSPENSION INTRAMUSCULAR at 13:30

## 2018-09-23 RX ADMIN — HEPARIN SODIUM 5000 UNIT(S): 5000 INJECTION INTRAVENOUS; SUBCUTANEOUS at 13:30

## 2018-09-23 RX ADMIN — HEPARIN SODIUM 5000 UNIT(S): 5000 INJECTION INTRAVENOUS; SUBCUTANEOUS at 05:29

## 2018-09-23 NOTE — PROGRESS NOTE ADULT - PROBLEM SELECTOR PLAN 2
Supplemental oxygen  Nebs
Supplemental oxygen  Nebs  -start protonix tonight  -cont cpap and measure oxygen saturations
Supplemental oxygen  Nebs  -start protonix tonight  -cont cpap and measure oxygen saturations

## 2018-09-23 NOTE — PROGRESS NOTE ADULT - PROBLEM SELECTOR PLAN 5
pt doesn't know her medications.  psych consult called.
-stable
pt doesn't know her medications.  psych consult called.

## 2018-09-23 NOTE — DISCHARGE NOTE ADULT - PATIENT PORTAL LINK FT
You can access the [facility] Patient Portal, offered by [facility], by registering with the following website: [email or web address]

## 2018-09-23 NOTE — PROGRESS NOTE ADULT - ASSESSMENT
is a 62 years old female with PMH of HTN, DM and schizoaffective disorder brought in to the ER for the evaluation of low O2 sat. Reportedly patient was not feeling well for the past several days. She called EMS who found her with low O2 sat and was transported to the ER. Patient reports seeing people in her house. She is poor historian and is unable to provide any more information. Denies any fever, chills, nausea, vomiting, chest pain, SOB, diarrhea or constipation.    JABIER present upon admission - SCr 4; improved with IVF. No evidence of obstructive uropathy on imaging.    Dyspnea likely multifactorial - VQ low probability. LE dopplers negative. She can be d/cd tomm after her meds are delivered.
62 years old female with PMH of HTN, DM and schizoaffective disorder brought in to the ER for the evaluation of low O2 sat. Reportedly patient was not feeling well for the past several days. She called EMS who found her with low O2 sat and was transported to the ER. Patient reports seeing people in her house. She is poor historian and is unable to provide any more information. Denies any fever, chills, nausea, vomiting, chest pain, SOB, diarrhea or constipation.    JABIER present upon admission - SCr 4; improved with IVF. No evidence of obstructive uropathy on imaging.    Dyspnea likely multifactorial - VQ low probability. LE dopplers negative.
ARF: etiology unclear but improved with hydration so pt likely was vol depleted  ? outpatient medications which could have contributed to ARF  No obstruction on renal sonogram  - d/c IVF  - avoid potential nephrotoxins  - can d/c home from renal standpoint
ARF: etiology unclear but resolved with hydration so pt likely was vol depleted  ? outpatient medications which could have contributed to ARF  No obstruction on renal sonogram  - no further recommendations from renal standpoint  - will no longer follow; please recall if needed
ARF: improving with hydration  No obstruction on renal sonogram  DM, HTN==> no knowledge of CRF; no old labs available  - cont IVF; monitor renal function  - old labs and outpatient medication list would be helpful  - further renal w/u depends upon clinical course
62 years old female with PMH of HTN, DM and schizoaffective disorder brought in to the ER for the evaluation of low O2 sat. Reportedly patient was not feeling well for the past several days. She called EMS who found her with low O2 sat and was transported to the ER. Patient reports seeing people in her house. She is poor historian and is unable to provide any more information. Denies any fever, chills, nausea, vomiting, chest pain, SOB, diarrhea or constipation.    JABIER present upon admission - SCr 4; improved with IVF. No evidence of obstructive uropathy on imaging.    Dyspnea likely multifactorial - VQ low probability. LE dopplers negative.

## 2018-09-23 NOTE — PROGRESS NOTE ADULT - PROBLEM SELECTOR PROBLEM 5
Schizoaffective disorder, bipolar type

## 2018-09-23 NOTE — DISCHARGE NOTE ADULT - CARE PLAN
Principal Discharge DX:	Hypoxia  Goal:	Please take all meds and f/u with PMD  Assessment and plan of treatment:	Please take all meds and f/u with PMD as an outpatient

## 2018-09-23 NOTE — PROGRESS NOTE ADULT - PROBLEM SELECTOR PLAN 4
pt doesn't know her medications.  ADA diet.  FS with SSI  HbA1C

## 2018-09-23 NOTE — PROGRESS NOTE ADULT - SUBJECTIVE AND OBJECTIVE BOX
is a 62y old  Female who presents with a chief complaint of hypoxia. She appears to be doing much better. She uses a CPAP at night, and she does have significant reflux, and she may be having symptoms of reflux pneumonitis. For now, we have started protonix, and we will observe her oxygen saturations overnight.     She is doing very well and she no longer has any episodes of hypoxia. She is going to be d/cd tomm., her ACT team delivers medications and they do not work today.     Summary:   REVIEW OF SYSTEMS    General:	"I'm doing okay, feeling better."    Skin/Breast:  	  Ophthalmologic:  	  ENMT:	    Respiratory and Thorax:  	  Cardiovascular:	    Gastrointestinal:	    Genitourinary:	    Musculoskeletal:	    Neurological:	    Psychiatric:	    Hematology/Lymphatics:	    Endocrine:	    Allergic/Immunologic:    Vital Signs Last 24 Hrs  T(C): 36.9 (23 Sep 2018 07:54), Max: 37.4 (22 Sep 2018 17:43)  T(F): 98.4 (23 Sep 2018 07:54), Max: 99.4 (22 Sep 2018 17:43)  HR: 72 (23 Sep 2018 07:54) (72 - 97)  BP: 112/66 (23 Sep 2018 07:54) (112/66 - 127/76)  BP(mean): --  RR: 16 (23 Sep 2018 07:54) (16 - 20)  SpO2: 92% (23 Sep 2018 07:54) (92% - 98%)  PHYSICAL EXAMINATION  General: NAD[+]   nontoxic[]   ill-appearing[]  Obese[+]  HEENT: AT/NC[]  PERRLA[]  EOMI[+]   Moist oral mucosa[]  Pharyngeal exudates[]  NECK: Supple[+]  JVD[] Carotid bruit[]  CVS: RRR[+]  Irregular[]  S1+S2[+]   Murmur[]  RESP: Fair air entry bilaterally[+]   Clear sounds[]   poor effort []  wheeze[]   Crackles[]  GI: Soft[+]  Nondistended[]   Nontender[+]   Bowel Sounds[+]  Mass[]   HSM[]   Ascites[]  : suprapubic tenderness[-]   CVA Tenderness[]   Montanez[-]  MS: FROM[+]   Edema[+]  CNS: AAOx3[+]  grossly intact  INTEG: Skin is Warm[+]  dry[+] Lesion[] Decubitus[]  PSYCH: Fair Mood, Affect                               12.6   9.7   )-----------( 372      ( 23 Sep 2018 09:35 )             38.5     09-23    136  |  96<L>  |  14.0  ----------------------------<  95  5.0   |  28.0  |  0.72    Ca    9.7      23 Sep 2018 09:35  Phos  3.5     09-23  Mg     2.2     09-23    TPro  7.1  /  Alb  3.3  /  TBili  0.3<L>  /  DBili  x   /  AST  38<H>  /  ALT  35<H>  /  AlkPhos  139<H>  09-22    LIVER FUNCTIONS - ( 22 Sep 2018 07:25 )  Alb: 3.3 g/dL / Pro: 7.1 g/dL / ALK PHOS: 139 U/L / ALT: 35 U/L / AST: 38 U/L / GGT: x           Radiology:     MEDICATIONS  (STANDING):  amLODIPine   Tablet 2.5 milliGRAM(s) Oral daily  dextrose 5%. 1000 milliLiter(s) (50 mL/Hr) IV Continuous <Continuous>  dextrose 50% Injectable 12.5 Gram(s) IV Push once  dextrose 50% Injectable 25 Gram(s) IV Push once  dextrose 50% Injectable 25 Gram(s) IV Push once  heparin  Injectable 5000 Unit(s) SubCutaneous every 8 hours  insulin lispro (HumaLOG) corrective regimen sliding scale   SubCutaneous three times a day before meals    MEDICATIONS  (PRN):  ALBUTerol    0.083% 2.5 milliGRAM(s) Nebulizer every 6 hours PRN Shortness of Breath and/or Wheezing  dextrose 40% Gel 15 Gram(s) Oral once PRN Blood Glucose LESS THAN 70 milliGRAM(s)/deciliter  glucagon  Injectable 1 milliGRAM(s) IntraMuscular once PRN Glucose LESS THAN 70 milligrams/deciliter
HOSPITALIST PROGRESS NOTE    MIRNA MARROQUIN  [id number]  62yFemale    Patient is a 62y old  Female who presents with a chief complaint of     SUBJECTIVE:   Chart reviewed since last visit.  Patient seen and examined at bedside for renal failure.  Denies any dizziness - though still feels dyspneic      OBJECTIVE:  Vital Signs Last 24 Hrs  T(C): 37.4 (21 Sep 2018 12:13), Max: 37.5 (20 Sep 2018 22:18)  T(F): 99.3 (21 Sep 2018 12:13), Max: 99.5 (20 Sep 2018 22:18)  HR: 77 (21 Sep 2018 12:13) (77 - 86)  BP: 112/66 (21 Sep 2018 12:13) (110/67 - 128/85)  BP(mean): 83 (20 Sep 2018 18:45) (83 - 83)  RR: 18 (21 Sep 2018 12:13) (16 - 18)  SpO2: 99% (21 Sep 2018 05:56) (97% - 99%)    PHYSICAL EXAMINATION  General: NAD[+]   nontoxic[]   ill-appearing[]  Obese[+]  HEENT: AT/NC[]  PERRLA[]  EOMI[+]   Moist oral mucosa[]  Pharyngeal exudates[]  NECK: Supple[+]  JVD[] Carotid bruit[]  CVS: RRR[+]  Irregular[]  S1+S2[+]   Murmur[]  RESP: Fair air entry bilaterally[+]   Clear sounds[]   poor effort []  wheeze[]   Crackles[]  GI: Soft[+]  Nondistended[]   Nontender[+]   Bowel Sounds[+]  Mass[]   HSM[]   Ascites[]  : suprapubic tenderness[-]   CVA Tenderness[]   Montanez[-]  MS: FROM[+]   Edema[+]  CNS: AAOx3[+]  grossly intact  INTEG: Skin is Warm[+]  dry[+] Lesion[] Decubitus[]  PSYCH: Fair Mood, Affect    MONITOR:  CAPILLARY BLOOD GLUCOSE      POCT Blood Glucose.: 98 mg/dL (21 Sep 2018 12:14)  POCT Blood Glucose.: 102 mg/dL (21 Sep 2018 07:54)  POCT Blood Glucose.: 131 mg/dL (20 Sep 2018 22:41)        I&O's Summary                          11.6   12.5  )-----------( 284      ( 20 Sep 2018 14:33 )             35.8           135  |  100  |  33.0<H>  ----------------------------<  98  4.8   |  21.0<L>  |  2.15<H>    Ca    9.3      21 Sep 2018 07:35  Phos  3.3         TPro  7.2  /  Alb  2.7<L>  /  TBili  0.3<L>  /  DBili  x   /  AST  48<H>  /  ALT  33<H>  /  AlkPhos  157<H>      CARDIAC MARKERS ( 21 Sep 2018 07:35 )  x     / x     / 924 U/L / x     / 6.4 ng/mL  CARDIAC MARKERS ( 20 Sep 2018 14:33 )  x     / <0.01 ng/mL / 1106 U/L / x     / 8.4 ng/mL      Urinalysis Basic - ( 20 Sep 2018 17:43 )    Color: Yellow / Appearance: Cloudy / S.015 / pH: x  Gluc: x / Ketone: Trace  / Bili: Small / Urobili: 4 mg/dL   Blood: x / Protein: 30 mg/dL / Nitrite: Negative   Leuk Esterase: Moderate / RBC: 11-25 /HPF / WBC 11-25   Sq Epi: x / Non Sq Epi: Occasional / Bacteria: Moderate        Culture:    TTE:    RADIOLOGY  < from: US Renal (18 @ 20:07) >   EXAM:  US KIDNEY(S)                          PROCEDURE DATE:  2018          INTERPRETATION:  CLINICAL INFORMATION: Acute renal failure    COMPARISON: None available.    TECHNIQUE: Sonography of the kidneys.     FINDINGS:    Right kidney:  13.9 cm. No renal mass, hydronephrosis or calculi.    Left kidney: 13.1 cm. No renal mass, hydronephrosis or calculi.    The liver is echogenic suggesting fatty infiltration.    IMPRESSION:     No hydronephrosis.                 KATE BUCIO M.D., ATTENDING RADIOLOGIST  This document has been electronically signed. Sep 20 2018  8:17PM        < end of copied text >    < from: NM Pulmonary Ventilation/Perfusion Scan (18 @ 16:44) >     EXAM:  NM PULM VENTILATION PERFUS IMG                          PROCEDURE DATE:  2018          INTERPRETATION:  CLINICAL INFORMATION: 62-year-old female with shortness   of breath, chest pain and elevated d-dimer, referred to evaluate for   pulmonary embolism.    RADIOPHARMACEUTICAL: 1 mCi Tc-99m-DTPA by inhalation; 6.2 mCi Tc-99m-MAA,   I.V.    TECHNIQUE:  Ventilation and perfusion images of the lungs were obtained   following administration of Tc-99m-DTPA and Tc-99m-MAA. Images were   obtained in the anterior, posterior, both lateral, and all 4 oblique   projections. This study was interpreted in conjunction with a chest   radiograph of 2018    COMPARISON: No previous lung V/Q scan for comparison     FINDINGS: There is heterogeneous radiotracer distribution in the lungs on   both the ventilation and the perfusion images. No segmental perfusion   defects are identified      IMPRESSION: Very low probability of pulmonary embolus.      < end of copied text >  < from: US Duplex Venous Lower Ext Complete, Bilateral (18 @ 20:12) >  XAM:  US DPLX LWR EXT VEINS COMPL BI                          PROCEDURE DATE:  2018          INTERPRETATION:  CLINICAL INFORMATION: Elevated d-dimer, negative CT   scan, rule out DVT    COMPARISON: Ultrasound dated 2012    TECHNIQUE: Duplex sonography of the BILATERAL LOWER extremities with   color and spectral Doppler, with and without compression.      FINDINGS:    There is normal compressibility of the bilateral common femoral, femoral   and popliteal veins. No calf vein thrombosis is detected.    Doppler examination shows normal spontaneous and phasic flow.    IMPRESSION:     No evidence of bilateral lower extremity deep venous thrombosis.                    KATE BUCIO M.D., ATTENDING RADIOLOGIST  This document has been electronically signed. Sep 20 2018  8:16PM        < end of copied text >      MEDICATIONS  (STANDING):  amLODIPine   Tablet 2.5 milliGRAM(s) Oral daily  dextrose 5%. 1000 milliLiter(s) (50 mL/Hr) IV Continuous <Continuous>  dextrose 50% Injectable 12.5 Gram(s) IV Push once  dextrose 50% Injectable 25 Gram(s) IV Push once  dextrose 50% Injectable 25 Gram(s) IV Push once  heparin  Injectable 5000 Unit(s) SubCutaneous every 8 hours  insulin lispro (HumaLOG) corrective regimen sliding scale   SubCutaneous three times a day before meals  sodium chloride 0.9%. 1000 milliLiter(s) (100 mL/Hr) IV Continuous <Continuous>      MEDICATIONS  (PRN):  ALBUTerol    0.083% 2.5 milliGRAM(s) Nebulizer every 6 hours PRN Shortness of Breath and/or Wheezing  dextrose 40% Gel 15 Gram(s) Oral once PRN Blood Glucose LESS THAN 70 milliGRAM(s)/deciliter  glucagon  Injectable 1 milliGRAM(s) IntraMuscular once PRN Glucose LESS THAN 70 milligrams/deciliter
NEPHROLOGY INTERVAL HPI/OVERNIGHT EVENTS:  pt comfortable  no acute distress  no cp, sob, n/v/d    MEDICATIONS  (STANDING):  amLODIPine   Tablet 2.5 milliGRAM(s) Oral daily  dextrose 5%. 1000 milliLiter(s) (50 mL/Hr) IV Continuous <Continuous>  dextrose 50% Injectable 12.5 Gram(s) IV Push once  dextrose 50% Injectable 25 Gram(s) IV Push once  dextrose 50% Injectable 25 Gram(s) IV Push once  heparin  Injectable 5000 Unit(s) SubCutaneous every 8 hours  insulin lispro (HumaLOG) corrective regimen sliding scale   SubCutaneous three times a day before meals  sodium chloride 0.9%. 1000 milliLiter(s) (100 mL/Hr) IV Continuous <Continuous>    MEDICATIONS  (PRN):  ALBUTerol    0.083% 2.5 milliGRAM(s) Nebulizer every 6 hours PRN Shortness of Breath and/or Wheezing  dextrose 40% Gel 15 Gram(s) Oral once PRN Blood Glucose LESS THAN 70 milliGRAM(s)/deciliter  glucagon  Injectable 1 milliGRAM(s) IntraMuscular once PRN Glucose LESS THAN 70 milligrams/deciliter      Allergies    No Known Allergies          Vital Signs Last 24 Hrs  T(C): 36.7 (21 Sep 2018 05:56), Max: 37.5 (20 Sep 2018 22:18)  T(F): 98.1 (21 Sep 2018 05:56), Max: 99.5 (20 Sep 2018 22:18)  HR: 81 (21 Sep 2018 05:56) (81 - 88)  BP: 113/69 (21 Sep 2018 05:56) (96/55 - 128/85)  BP(mean): 83 (20 Sep 2018 18:45) (83 - 83)  RR: 18 (21 Sep 2018 05:56) (16 - 18)  SpO2: 99% (21 Sep 2018 05:56) (88% - 99%)    PHYSICAL EXAM:  GENERAL: Obese  ENMT: No jvd  NECK: Supple, no jvd  NERVOUS SYSTEM:  Alert & Oriented   CHEST/LUNG: Clear bilaterally==> BS diminished at bases  HEART: Regular rate and rhythm; No rub  ABDOMEN: Soft, Nontender, Nondistended; +BS  EXTREMITIES: + tr edema  SKIN: No rashes or lesions, pale    LABS:                        11.6   12.5  )-----------( 284      ( 20 Sep 2018 14:33 )             35.8         135  |  100  |  33.0<H>  ----------------------------<  98  4.8   |  21.0<L>  |  2.15<H>    Creatinine, Serum: 4.10 mg/dL (18 @ 14:33)        Ca    9.3      21 Sep 2018 07:35  Phos  3.3         TPro  7.2  /  Alb  2.7<L>  /  TBili  0.3<L>  /  DBili  x   /  AST  48<H>  /  ALT  33<H>  /  AlkPhos  157<H>        Urinalysis Basic - ( 20 Sep 2018 17:43 )    Color: Yellow / Appearance: Cloudy / S.015 / pH: x  Gluc: x / Ketone: Trace  / Bili: Small / Urobili: 4 mg/dL   Blood: x / Protein: 30 mg/dL / Nitrite: Negative   Leuk Esterase: Moderate / RBC: 11-25 /HPF / WBC 11-25   Sq Epi: x / Non Sq Epi: Occasional / Bacteria: Moderate      Phosphorus Level, Serum: 3.3 mg/dL ( @ 07:35)      RADIOLOGY & ADDITIONAL TESTS:  < from: US Renal (18 @ 20:07) >     EXAM:  US KIDNEY(S)                          PROCEDURE DATE:  2018          INTERPRETATION:  CLINICAL INFORMATION: Acute renal failure    COMPARISON: None available.    TECHNIQUE: Sonography of the kidneys.     FINDINGS:    Right kidney:  13.9 cm. No renal mass, hydronephrosis or calculi.    Left kidney: 13.1 cm. No renal mass, hydronephrosis or calculi.    The liver is echogenic suggesting fatty infiltration.    IMPRESSION:     No hydronephrosis.       < end of copied text >
NEPHROLOGY INTERVAL HPI/OVERNIGHT EVENTS:  pt doing well  no cp, sob, n/v/d  voiding clear, yellow urine    MEDICATIONS  (STANDING):  amLODIPine   Tablet 2.5 milliGRAM(s) Oral daily  dextrose 5%. 1000 milliLiter(s) (50 mL/Hr) IV Continuous <Continuous>  dextrose 50% Injectable 12.5 Gram(s) IV Push once  dextrose 50% Injectable 25 Gram(s) IV Push once  dextrose 50% Injectable 25 Gram(s) IV Push once  heparin  Injectable 5000 Unit(s) SubCutaneous every 8 hours  insulin lispro (HumaLOG) corrective regimen sliding scale   SubCutaneous three times a day before meals  sodium chloride 0.9%. 1000 milliLiter(s) (100 mL/Hr) IV Continuous <Continuous>    MEDICATIONS  (PRN):  ALBUTerol    0.083% 2.5 milliGRAM(s) Nebulizer every 6 hours PRN Shortness of Breath and/or Wheezing  dextrose 40% Gel 15 Gram(s) Oral once PRN Blood Glucose LESS THAN 70 milliGRAM(s)/deciliter  glucagon  Injectable 1 milliGRAM(s) IntraMuscular once PRN Glucose LESS THAN 70 milligrams/deciliter      Allergies    No Known Allergies    Intolerances        Vital Signs Last 24 Hrs  T(C): 37.2 (22 Sep 2018 05:04), Max: 37.4 (21 Sep 2018 12:13)  T(F): 98.9 (22 Sep 2018 05:04), Max: 99.4 (21 Sep 2018 16:35)  HR: 72 (22 Sep 2018 05:04) (72 - 77)  BP: 124/75 (22 Sep 2018 05:04) (111/62 - 129/82)  BP(mean): --  RR: 18 (22 Sep 2018 05:04) (18 - 18)  SpO2: 97% (22 Sep 2018 05:04) (97% - 97%)    PHYSICAL EXAM:  GENERAL: Obese  ENMT: No jvd  NECK: Supple, no jvd  NERVOUS SYSTEM:  Alert & Oriented   CHEST/LUNG: Clear bilaterally==> BS diminished at bases  HEART: Regular rate and rhythm; No rub  ABDOMEN: Soft, Nontender, Nondistended; +BS  EXTREMITIES: + tr edema      LABS:                        11.6   8.7   )-----------( 314      ( 22 Sep 2018 07:27 )             36.2     09-    136  |  100  |  20.0  ----------------------------<  105  4.7   |  25.0  |  1.05    Ca    9.1      22 Sep 2018 07:25  Phos  3.3         TPro  7.1  /  Alb  3.3  /  TBili  0.3<L>  /  DBili  x   /  AST  38<H>  /  ALT  35<H>  /  AlkPhos  139<H>        Urinalysis Basic - ( 20 Sep 2018 17:43 )    Color: Yellow / Appearance: Cloudy / S.015 / pH: x  Gluc: x / Ketone: Trace  / Bili: Small / Urobili: 4 mg/dL   Blood: x / Protein: 30 mg/dL / Nitrite: Negative   Leuk Esterase: Moderate / RBC: 11-25 /HPF / WBC 11-25   Sq Epi: x / Non Sq Epi: Occasional / Bacteria: Moderate      Intact PTH: 32 pg/mL ( @ 19:57)      RADIOLOGY & ADDITIONAL TESTS:  < from: US Renal (18 @ 20:07) >     EXAM:  US KIDNEY(S)                          PROCEDURE DATE:  2018          INTERPRETATION:  CLINICAL INFORMATION: Acute renal failure    COMPARISON: None available.    TECHNIQUE: Sonography of the kidneys.     FINDINGS:    Right kidney:  13.9 cm. No renal mass, hydronephrosis or calculi.    Left kidney: 13.1 cm. No renal mass, hydronephrosis or calculi.    The liver is echogenic suggesting fatty infiltration.    IMPRESSION:     No hydronephrosis.     < end of copied text >
NEPHROLOGY INTERVAL HPI/OVERNIGHT EVENTS:  pt doing well  tolerating diet  asymptomatic  voiding clear urine    MEDICATIONS  (STANDING):  amLODIPine   Tablet 2.5 milliGRAM(s) Oral daily  dextrose 5%. 1000 milliLiter(s) (50 mL/Hr) IV Continuous <Continuous>  dextrose 50% Injectable 12.5 Gram(s) IV Push once  dextrose 50% Injectable 25 Gram(s) IV Push once  dextrose 50% Injectable 25 Gram(s) IV Push once  heparin  Injectable 5000 Unit(s) SubCutaneous every 8 hours  influenza   Vaccine 0.5 milliLiter(s) IntraMuscular once  insulin lispro (HumaLOG) corrective regimen sliding scale   SubCutaneous three times a day before meals    MEDICATIONS  (PRN):  ALBUTerol    0.083% 2.5 milliGRAM(s) Nebulizer every 6 hours PRN Shortness of Breath and/or Wheezing  dextrose 40% Gel 15 Gram(s) Oral once PRN Blood Glucose LESS THAN 70 milliGRAM(s)/deciliter  glucagon  Injectable 1 milliGRAM(s) IntraMuscular once PRN Glucose LESS THAN 70 milligrams/deciliter      Allergies    No Known Allergies          Vital Signs Last 24 Hrs  T(C): 36.9 (23 Sep 2018 07:54), Max: 37.4 (22 Sep 2018 17:43)  T(F): 98.4 (23 Sep 2018 07:54), Max: 99.4 (22 Sep 2018 17:43)  HR: 72 (23 Sep 2018 07:54) (72 - 97)  BP: 112/66 (23 Sep 2018 07:54) (112/66 - 142/68)  BP(mean): --  RR: 16 (23 Sep 2018 07:54) (16 - 20)  SpO2: 92% (23 Sep 2018 07:54) (92% - 98%)    PHYSICAL EXAM:  GENERAL: Obese  ENMT: No jvd  NECK: Supple, no jvd  NERVOUS SYSTEM:  Alert & Oriented   CHEST/LUNG: Clear bilaterally  HEART: Regular rate and rhythm; No rub  ABDOMEN: Soft, Nontender, Nondistended; +BS  EXTREMITIES: no edema    LABS:                        12.6   9.7   )-----------( 372      ( 23 Sep 2018 09:35 )             38.5     09-23    136  |  96<L>  |  14.0  ----------------------------<  95  5.0   |  28.0  |  0.72    Ca    9.7      23 Sep 2018 09:35  Phos  3.5     09-23  Mg     2.2     09-23    TPro  7.1  /  Alb  3.3  /  TBili  0.3<L>  /  DBili  x   /  AST  38<H>  /  ALT  35<H>  /  AlkPhos  139<H>  09-22        Magnesium, Serum: 2.2 mg/dL (09-23 @ 09:35)  Phosphorus Level, Serum: 3.5 mg/dL (09-23 @ 09:35)      RADIOLOGY & ADDITIONAL TESTS:
 is a 62y old  Female who presents with a chief complaint of hypoxia. She appears to be doing much better. She uses a CPAP at night, and she does have significant reflux, and she may be having symptoms of reflux pneumonitis. For now, we have started protonix, and we will observe her oxygen saturations overnight. If everything is fine, she can be d/cd home tomm.    Summary:   REVIEW OF SYSTEMS    General:	"I'm doing okay, feeling better."    Skin/Breast:  	  Ophthalmologic:  	  ENMT:	    Respiratory and Thorax:  	  Cardiovascular:	    Gastrointestinal:	    Genitourinary:	    Musculoskeletal:	    Neurological:	    Psychiatric:	    Hematology/Lymphatics:	    Endocrine:	    Allergic/Immunologic:	  Vital Signs Last 24 Hrs  T(C): 37.3 (22 Sep 2018 09:00), Max: 37.4 (21 Sep 2018 16:35)  T(F): 99.1 (22 Sep 2018 09:00), Max: 99.4 (21 Sep 2018 16:35)  HR: 79 (22 Sep 2018 09:00) (72 - 79)  BP: 120/81 (22 Sep 2018 09:00) (111/62 - 129/82)  BP(mean): --  RR: 18 (22 Sep 2018 09:00) (18 - 18)  SpO2: 96% (22 Sep 2018 09:00) (96% - 97%)  PHYSICAL EXAMINATION  General: NAD[+]   nontoxic[]   ill-appearing[]  Obese[+]  HEENT: AT/NC[]  PERRLA[]  EOMI[+]   Moist oral mucosa[]  Pharyngeal exudates[]  NECK: Supple[+]  JVD[] Carotid bruit[]  CVS: RRR[+]  Irregular[]  S1+S2[+]   Murmur[]  RESP: Fair air entry bilaterally[+]   Clear sounds[]   poor effort []  wheeze[]   Crackles[]  GI: Soft[+]  Nondistended[]   Nontender[+]   Bowel Sounds[+]  Mass[]   HSM[]   Ascites[]  : suprapubic tenderness[-]   CVA Tenderness[]   Montanez[-]  MS: FROM[+]   Edema[+]  CNS: AAOx3[+]  grossly intact  INTEG: Skin is Warm[+]  dry[+] Lesion[] Decubitus[]  PSYCH: Fair Mood, Affect                       11.6   8.7   )-----------( 314      ( 22 Sep 2018 07:27 )             36.2     09-22    136  |  100  |  20.0  ----------------------------<  105  4.7   |  25.0  |  1.05    Ca    9.1      22 Sep 2018 07:25  Phos  3.3     09-21    TPro  7.1  /  Alb  3.3  /  TBili  0.3<L>  /  DBili  x   /  AST  38<H>  /  ALT  35<H>  /  AlkPhos  139<H>  09-22    LIVER FUNCTIONS - ( 22 Sep 2018 07:25 )  Alb: 3.3 g/dL / Pro: 7.1 g/dL / ALK PHOS: 139 U/L / ALT: 35 U/L / AST: 38 U/L / GGT: x         Radiology:     MEDICATIONS  (STANDING):  amLODIPine   Tablet 2.5 milliGRAM(s) Oral daily  dextrose 5%. 1000 milliLiter(s) (50 mL/Hr) IV Continuous <Continuous>  dextrose 50% Injectable 12.5 Gram(s) IV Push once  dextrose 50% Injectable 25 Gram(s) IV Push once  dextrose 50% Injectable 25 Gram(s) IV Push once  heparin  Injectable 5000 Unit(s) SubCutaneous every 8 hours  insulin lispro (HumaLOG) corrective regimen sliding scale   SubCutaneous three times a day before meals    MEDICATIONS  (PRN):  ALBUTerol    0.083% 2.5 milliGRAM(s) Nebulizer every 6 hours PRN Shortness of Breath and/or Wheezing  dextrose 40% Gel 15 Gram(s) Oral once PRN Blood Glucose LESS THAN 70 milliGRAM(s)/deciliter  glucagon  Injectable 1 milliGRAM(s) IntraMuscular once PRN Glucose LESS THAN 70 milligrams/deciliter

## 2018-09-23 NOTE — PROGRESS NOTE ADULT - PROBLEM SELECTOR PLAN 3
-cont low dose norvasc.  monitor BP.
BP in normal range.  patient doesn't know her medications and no family member around.  started on low dose norvasc.  monitor BP.
-cont low dose norvasc.  monitor BP.

## 2018-09-23 NOTE — CHART NOTE - NSCHARTNOTEFT_GEN_A_CORE
LESLIE NOTE: LESLIE received call from Dr. Lobo regarding possible d/c for today. Pt is part of [facility] ACT team. Pt reported that she gets her medication from [facility] in [place] (open until 6pm on Sunday). 's concern is how can pt get meds if she is d/vanda today. Pt reports her emergency contact does not drive. LESLIE placed call to emergency off hour  line at [facility] ([phone]) and spoke to dispatcher. Dispatcher placed call out to field  on duty to see if they can help pt get medications today. LESLIE is awaiting a call back from this . LESLIE informed pt's DR. NELSON awaiting call back.

## 2018-09-23 NOTE — PROGRESS NOTE ADULT - PROBLEM SELECTOR PLAN 1
-resolved
Continue NS at 100 ml/hr.  renally dosed medications.  follow up BMP in am  nephrology follow up
-resolved

## 2018-09-23 NOTE — DISCHARGE NOTE ADULT - HOSPITAL COURSE
is a 61 y/o, , female, mother of two adults (ages 38 and 39); non-caregiver; with hx of chronic schizophrenia; followed by [facility] ACT team for at least 8 yrs., maintained at home; has the ACT team in place; with a history of inpatient psychiatric admissions, who was admitted for hypoxia. Her CT angio is negative and she's doing well, using her regular CPAP at night. She no longer has hypoxia. She did complain of reflux and she most likely had a little reflux pneumonitis, causing some respiratory distress on admission. She can be d/cd on norvasc and she an be d/cd on protonix, with outpatient f/u with her regular ACT team. Her emergency contact is taking her home, meds sent to pharmacy in [place], [place].     She can be d/cd home today, she insists on leaving today. She's pleased with her care.

## 2018-09-23 NOTE — DISCHARGE NOTE ADULT - MEDICATION SUMMARY - MEDICATIONS TO TAKE
I will START or STAY ON the medications listed below when I get home from the hospital:    amLODIPine 2.5 mg oral tablet  -- 1 tab(s) by mouth once a day MDD:1 tab  -- Indication: For Htn    pantoprazole 40 mg oral delayed release tablet  -- 1 tab(s) by mouth once a day (before a meal) MDD:1 tab  -- Indication: For Reflux

## 2018-09-24 LAB
ANA PAT FLD IF-IMP: ABNORMAL
ANA TITR SER: ABNORMAL

## 2019-01-10 ENCOUNTER — APPOINTMENT (OUTPATIENT)
Dept: NEUROLOGY | Facility: CLINIC | Age: 64
End: 2019-01-10
Payer: MEDICAID

## 2019-01-10 VITALS
HEIGHT: 62 IN | WEIGHT: 240 LBS | BODY MASS INDEX: 44.16 KG/M2 | SYSTOLIC BLOOD PRESSURE: 118 MMHG | DIASTOLIC BLOOD PRESSURE: 68 MMHG

## 2019-01-10 PROCEDURE — 99204 OFFICE O/P NEW MOD 45 MIN: CPT

## 2019-01-10 NOTE — CONSULT LETTER
[Dear  ___] : Dear  [unfilled], [Courtesy Letter:] : I had the pleasure of seeing your patient, [unfilled], in my office today. [Please see my note below.] : Please see my note below. [Sincerely,] : Sincerely, [FreeTextEntry3] : Lucas Duggan MD.

## 2019-01-10 NOTE — ASSESSMENT
[FreeTextEntry1] : This is a 63 year-old woman with a long history of chronic lower back pain.\par \par She describes having had imaging which herniations the past\par \par I will refer her back to physical therapy.\par I explained the importance of weight loss for spine health.\par \par I will see her back in 8 weeks. If there is no improvement then repeat imaging and pain management referral may be needed.

## 2019-01-10 NOTE — HISTORY OF PRESENT ILLNESS
[FreeTextEntry1] : I saw this patient in the office today.\par \par She describes a history of lower back pain.\par This has been with her for many years.\par She reports that she did physical therapy about 10 years ago with improvement.\par The pains gradually increased again.\par They are now daily.\par They wax and wane in intensity.\par She denies associated radiation down the legs.\par

## 2019-01-10 NOTE — PHYSICAL EXAM
[General Appearance - Alert] : alert [General Appearance - In No Acute Distress] : in no acute distress [Oriented To Time, Place, And Person] : oriented to person, place, and time [Memory Recent] : recent memory was not impaired [Memory Remote] : remote memory was not impaired [Cranial Nerves Optic (II)] : visual acuity intact bilaterally,  visual fields full to confrontation, pupils equal round and reactive to light [Cranial Nerves Oculomotor (III)] : extraocular motion intact [Cranial Nerves Trigeminal (V)] : facial sensation intact symmetrically [Cranial Nerves Facial (VII)] : face symmetrical [Cranial Nerves Vestibulocochlear (VIII)] : hearing was intact bilaterally [Cranial Nerves Glossopharyngeal (IX)] : tongue and palate midline [Cranial Nerves Accessory (XI - Cranial And Spinal)] : head turning and shoulder shrug symmetric [Cranial Nerves Hypoglossal (XII)] : there was no tongue deviation with protrusion [Motor Tone] : muscle tone was normal in all four extremities [Motor Strength] : muscle strength was normal in all four extremities [Sensation Tactile Decrease] : light touch was intact [Sensation Pain / Temperature Decrease] : pain and temperature was intact [Limited Balance] : the patient's balance was impaired [1+] : Patella left 1+ [0] : Ankle jerk left 0 [Optic Disc Abnormality] : the optic disc were normal in size and color [Edema] : there was no peripheral edema [Involuntary Movements] : no involuntary movements were seen [FreeTextEntry1] : The patient is obese. [Dysarthria] : no dysarthria [Aphasia] : no dysphasia/aphasia [Coordination - Dysmetria Impaired Finger-to-Nose Bilateral] : not present [Plantar Reflex Right Only] : normal on the right [Plantar Reflex Left Only] : normal on the left [FreeTextEntry8] : Gait is antalgic.

## 2019-01-15 LAB
GAS PNL BLDA: SIGNIFICANT CHANGE UP
HCO3 BLDA-SCNC: 24 MMOL/L — SIGNIFICANT CHANGE UP (ref 20–26)
PCO2 BLDA: 51 MMHG — HIGH (ref 35–45)
PH BLDA: 7.33 — LOW (ref 7.35–7.45)
PO2 BLDA: 79 MMHG — LOW (ref 83–108)
SAO2 % BLDA: 96 % — SIGNIFICANT CHANGE UP (ref 95–99)

## 2019-02-11 ENCOUNTER — APPOINTMENT (OUTPATIENT)
Dept: DERMATOLOGY | Facility: CLINIC | Age: 64
End: 2019-02-11
Payer: MEDICAID

## 2019-02-11 PROCEDURE — 99202 OFFICE O/P NEW SF 15 MIN: CPT

## 2019-02-12 ENCOUNTER — APPOINTMENT (OUTPATIENT)
Dept: GYNECOLOGIC ONCOLOGY | Facility: CLINIC | Age: 64
End: 2019-02-12
Payer: MEDICAID

## 2019-02-12 DIAGNOSIS — Z82.49 FAMILY HISTORY OF ISCHEMIC HEART DISEASE AND OTHER DISEASES OF THE CIRCULATORY SYSTEM: ICD-10-CM

## 2019-02-12 DIAGNOSIS — E11.9 TYPE 2 DIABETES MELLITUS W/OUT COMPLICATIONS: ICD-10-CM

## 2019-02-12 DIAGNOSIS — I10 ESSENTIAL (PRIMARY) HYPERTENSION: ICD-10-CM

## 2019-02-12 DIAGNOSIS — R93.89 ABNORMAL FINDINGS ON DIAGNOSTIC IMAGING OF OTHER SPECIFIED BODY STRUCTURES: ICD-10-CM

## 2019-02-12 DIAGNOSIS — Z83.3 FAMILY HISTORY OF DIABETES MELLITUS: ICD-10-CM

## 2019-02-12 DIAGNOSIS — Z80.9 FAMILY HISTORY OF MALIGNANT NEOPLASM, UNSPECIFIED: ICD-10-CM

## 2019-02-12 PROCEDURE — 76830 TRANSVAGINAL US NON-OB: CPT | Mod: 59

## 2019-02-12 PROCEDURE — 76376 3D RENDER W/INTRP POSTPROCES: CPT | Mod: TC,59

## 2019-02-12 PROCEDURE — 99205 OFFICE O/P NEW HI 60 MIN: CPT | Mod: 25

## 2019-02-12 RX ORDER — QUETIAPINE 200 MG/1
200 TABLET, FILM COATED ORAL
Refills: 0 | Status: ACTIVE | COMMUNITY

## 2019-02-12 RX ORDER — SIMVASTATIN 20 MG/1
20 TABLET, FILM COATED ORAL
Refills: 0 | Status: ACTIVE | COMMUNITY

## 2019-02-12 RX ORDER — LEVOTHYROXINE SODIUM 0.09 MG/1
88 TABLET ORAL
Refills: 0 | Status: ACTIVE | COMMUNITY

## 2019-02-12 RX ORDER — AMLODIPINE BESYLATE 5 MG/1
TABLET ORAL
Refills: 0 | Status: ACTIVE | COMMUNITY

## 2019-02-12 RX ORDER — METFORMIN HYDROCHLORIDE 500 MG/1
500 TABLET, COATED ORAL
Refills: 0 | Status: ACTIVE | COMMUNITY

## 2019-02-12 RX ORDER — BENZTROPINE MESYLATE 1 MG/1
1 TABLET ORAL
Refills: 0 | Status: ACTIVE | COMMUNITY

## 2019-02-12 RX ORDER — LAMOTRIGINE 100 MG/1
100 TABLET ORAL
Refills: 0 | Status: ACTIVE | COMMUNITY

## 2019-02-12 RX ORDER — NAPROXEN 500 MG/1
500 TABLET ORAL
Qty: 30 | Refills: 3 | Status: DISCONTINUED | COMMUNITY
Start: 2018-06-29 | End: 2019-02-12

## 2019-02-12 RX ORDER — OMEPRAZOLE 20 MG/1
20 CAPSULE, DELAYED RELEASE ORAL
Refills: 0 | Status: ACTIVE | COMMUNITY

## 2019-02-12 RX ORDER — LISINOPRIL 20 MG/1
20 TABLET ORAL
Refills: 0 | Status: ACTIVE | COMMUNITY

## 2019-02-12 RX ORDER — ALBUTEROL SULFATE 108 UG/1
108 (90 BASE) AEROSOL, METERED RESPIRATORY (INHALATION)
Refills: 0 | Status: ACTIVE | COMMUNITY

## 2019-02-12 RX ORDER — BUSPIRONE HCL 5 MG
TABLET ORAL
Refills: 0 | Status: ACTIVE | COMMUNITY

## 2019-02-12 RX ORDER — ALBUTEROL SULFATE 2.5 MG/3ML
(2.5 MG/3ML) SOLUTION RESPIRATORY (INHALATION)
Refills: 0 | Status: ACTIVE | COMMUNITY

## 2019-02-12 RX ORDER — FLUOXETINE HYDROCHLORIDE 40 MG/1
CAPSULE ORAL
Refills: 0 | Status: ACTIVE | COMMUNITY

## 2019-02-12 RX ORDER — MONTELUKAST 10 MG/1
10 TABLET, FILM COATED ORAL
Refills: 0 | Status: ACTIVE | COMMUNITY

## 2019-02-12 RX ORDER — FLUTICASONE PROPIONATE 110 MCG
110 AEROSOL WITH ADAPTER (GRAM) INHALATION
Refills: 0 | Status: ACTIVE | COMMUNITY

## 2019-02-12 NOTE — CHIEF COMPLAINT
[FreeTextEntry1] : [place] Office\par \par [facility] [phone] at [address] [place] [address]

## 2019-02-12 NOTE — PHYSICAL EXAM
[Abnormal] : Cervix: Abnormal [Normal] : Anus and perineum: Normal sphincter tone, no masses, no prolapse. [de-identified] : midline cesearan section scar noted  [de-identified] : stenotic  [de-identified] : Patient was interviewed and examined with chaperone present. Name of Chaperone: Liliana Londono PA-C

## 2019-02-12 NOTE — ASSESSMENT
[FreeTextEntry1] : This 64 y/o female with episoed of PMB and abnormal PAP revealing atypical glandular cells being referred for second opinion. US done in office today revealed a tiny uterus with 8mm thickened lining. Physical examination was limited secondary to body habitus but revealed a stenotic cervix. \par \par Discussed with the patient that we need further tissue sampling to evaluate the bleeding she is having and we would like to do that while she is under general anesthesia.  \par \par I discussed at length with the patient the nature, purpose, risks, benefits, and alternatives to dilation with curettage and possible hysteroscopy.   She understands the risks to include (but not be limited to): uterine perforation with possible need for laparoscopy and/or laparotomy; infection with need for hospitalization; fluid overload with possible critical illness as a consequence; and bleeding with need for transfusion.  The patient agrees to proceed.\par
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
## 2019-03-06 ENCOUNTER — OUTPATIENT (OUTPATIENT)
Dept: OUTPATIENT SERVICES | Facility: HOSPITAL | Age: 64
LOS: 1 days | End: 2019-03-06
Payer: COMMERCIAL

## 2019-03-06 DIAGNOSIS — Z01.818 ENCOUNTER FOR OTHER PREPROCEDURAL EXAMINATION: ICD-10-CM

## 2019-03-06 DIAGNOSIS — Z98.89 OTHER SPECIFIED POSTPROCEDURAL STATES: Chronic | ICD-10-CM

## 2019-03-06 LAB
ANION GAP SERPL CALC-SCNC: 14 MMOL/L — SIGNIFICANT CHANGE UP (ref 5–17)
BASOPHILS # BLD AUTO: 0 K/UL — SIGNIFICANT CHANGE UP (ref 0–0.2)
BASOPHILS NFR BLD AUTO: 0.2 % — SIGNIFICANT CHANGE UP (ref 0–2)
BLD GP AB SCN SERPL QL: SIGNIFICANT CHANGE UP
BUN SERPL-MCNC: 12 MG/DL — SIGNIFICANT CHANGE UP (ref 8–20)
CALCIUM SERPL-MCNC: 10.6 MG/DL — HIGH (ref 8.6–10.2)
CHLORIDE SERPL-SCNC: 95 MMOL/L — LOW (ref 98–107)
CO2 SERPL-SCNC: 28 MMOL/L — SIGNIFICANT CHANGE UP (ref 22–29)
CREAT SERPL-MCNC: 0.58 MG/DL — SIGNIFICANT CHANGE UP (ref 0.5–1.3)
EOSINOPHIL # BLD AUTO: 0 K/UL — SIGNIFICANT CHANGE UP (ref 0–0.5)
EOSINOPHIL NFR BLD AUTO: 0.5 % — SIGNIFICANT CHANGE UP (ref 0–6)
GLUCOSE SERPL-MCNC: 100 MG/DL — SIGNIFICANT CHANGE UP (ref 70–115)
HBA1C BLD-MCNC: 5.8 % — HIGH (ref 4–5.6)
HCT VFR BLD CALC: 39.4 % — SIGNIFICANT CHANGE UP (ref 37–47)
HGB BLD-MCNC: 13 G/DL — SIGNIFICANT CHANGE UP (ref 12–16)
LYMPHOCYTES # BLD AUTO: 1.5 K/UL — SIGNIFICANT CHANGE UP (ref 1–4.8)
LYMPHOCYTES # BLD AUTO: 16.6 % — LOW (ref 20–55)
MCHC RBC-ENTMCNC: 29.7 PG — SIGNIFICANT CHANGE UP (ref 27–31)
MCHC RBC-ENTMCNC: 33 G/DL — SIGNIFICANT CHANGE UP (ref 32–36)
MCV RBC AUTO: 90.2 FL — SIGNIFICANT CHANGE UP (ref 81–99)
MONOCYTES # BLD AUTO: 0.6 K/UL — SIGNIFICANT CHANGE UP (ref 0–0.8)
MONOCYTES NFR BLD AUTO: 6.5 % — SIGNIFICANT CHANGE UP (ref 3–10)
NEUTROPHILS # BLD AUTO: 7 K/UL — SIGNIFICANT CHANGE UP (ref 1.8–8)
NEUTROPHILS NFR BLD AUTO: 75.9 % — HIGH (ref 37–73)
PLATELET # BLD AUTO: 262 K/UL — SIGNIFICANT CHANGE UP (ref 150–400)
POTASSIUM SERPL-MCNC: 4.5 MMOL/L — SIGNIFICANT CHANGE UP (ref 3.5–5.3)
POTASSIUM SERPL-SCNC: 4.5 MMOL/L — SIGNIFICANT CHANGE UP (ref 3.5–5.3)
RBC # BLD: 4.37 M/UL — LOW (ref 4.4–5.2)
RBC # FLD: 13 % — SIGNIFICANT CHANGE UP (ref 11–15.6)
SODIUM SERPL-SCNC: 137 MMOL/L — SIGNIFICANT CHANGE UP (ref 135–145)
TYPE + AB SCN PNL BLD: SIGNIFICANT CHANGE UP
WBC # BLD: 9.2 K/UL — SIGNIFICANT CHANGE UP (ref 4.8–10.8)
WBC # FLD AUTO: 9.2 K/UL — SIGNIFICANT CHANGE UP (ref 4.8–10.8)

## 2019-03-06 PROCEDURE — 80048 BASIC METABOLIC PNL TOTAL CA: CPT

## 2019-03-06 PROCEDURE — 83036 HEMOGLOBIN GLYCOSYLATED A1C: CPT

## 2019-03-06 PROCEDURE — 86850 RBC ANTIBODY SCREEN: CPT

## 2019-03-06 PROCEDURE — 71046 X-RAY EXAM CHEST 2 VIEWS: CPT | Mod: 26

## 2019-03-06 PROCEDURE — 71046 X-RAY EXAM CHEST 2 VIEWS: CPT

## 2019-03-06 PROCEDURE — 85027 COMPLETE CBC AUTOMATED: CPT

## 2019-03-06 PROCEDURE — 36415 COLL VENOUS BLD VENIPUNCTURE: CPT

## 2019-03-06 PROCEDURE — 86901 BLOOD TYPING SEROLOGIC RH(D): CPT

## 2019-03-06 PROCEDURE — 93010 ELECTROCARDIOGRAM REPORT: CPT

## 2019-03-06 PROCEDURE — 86900 BLOOD TYPING SEROLOGIC ABO: CPT

## 2019-03-06 PROCEDURE — 93005 ELECTROCARDIOGRAM TRACING: CPT

## 2019-03-12 ENCOUNTER — APPOINTMENT (OUTPATIENT)
Dept: DERMATOLOGY | Facility: CLINIC | Age: 64
End: 2019-03-12
Payer: MEDICAID

## 2019-03-12 PROCEDURE — 99212 OFFICE O/P EST SF 10 MIN: CPT

## 2019-03-15 ENCOUNTER — APPOINTMENT (OUTPATIENT)
Dept: NEUROLOGY | Facility: CLINIC | Age: 64
End: 2019-03-15
Payer: MEDICAID

## 2019-03-15 PROCEDURE — 99213 OFFICE O/P EST LOW 20 MIN: CPT

## 2019-03-15 NOTE — PHYSICAL EXAM
[General Appearance - Alert] : alert [General Appearance - In No Acute Distress] : in no acute distress [Oriented To Time, Place, And Person] : oriented to person, place, and time [Memory Recent] : recent memory was not impaired [Memory Remote] : remote memory was not impaired [Cranial Nerves Optic (II)] : visual acuity intact bilaterally,  visual fields full to confrontation, pupils equal round and reactive to light [Cranial Nerves Oculomotor (III)] : extraocular motion intact [Cranial Nerves Trigeminal (V)] : facial sensation intact symmetrically [Cranial Nerves Facial (VII)] : face symmetrical [Cranial Nerves Vestibulocochlear (VIII)] : hearing was intact bilaterally [Cranial Nerves Glossopharyngeal (IX)] : tongue and palate midline [Cranial Nerves Accessory (XI - Cranial And Spinal)] : head turning and shoulder shrug symmetric [Cranial Nerves Hypoglossal (XII)] : there was no tongue deviation with protrusion [Motor Tone] : muscle tone was normal in all four extremities [Motor Strength] : muscle strength was normal in all four extremities [Sensation Tactile Decrease] : light touch was intact [Sensation Pain / Temperature Decrease] : pain and temperature was intact [Limited Balance] : the patient's balance was impaired [1+] : Patella left 1+ [Optic Disc Abnormality] : the optic disc were normal in size and color [Edema] : there was no peripheral edema [Involuntary Movements] : no involuntary movements were seen [FreeTextEntry1] : The patient is obese. [Dysarthria] : no dysarthria [Aphasia] : no dysphasia/aphasia [Coordination - Dysmetria Impaired Finger-to-Nose Bilateral] : not present [Plantar Reflex Right Only] : normal on the right [Plantar Reflex Left Only] : normal on the left [FreeTextEntry8] : Gait is antalgic and required a walker.

## 2019-03-15 NOTE — HISTORY OF PRESENT ILLNESS
[FreeTextEntry1] : I saw this patient in the office today.\par \par As you recall, she described a history of lower back pain.\par This has been with her for many years.\par She reports that she did physical therapy about 10 years ago with improvement.\par The pains gradually increased again.\par They are now daily.\par They wax and wane in intensity.\par She denies associated radiation down the legs.\par \par She reported that imaging had shown herniated discs in the past.\par \par I had referred her to physical therapy.\par She has not yet gone.\par

## 2019-03-15 NOTE — ASSESSMENT
[FreeTextEntry1] : This is a 63 year-old woman with a long history of chronic lower back pain.\par \par She describes having had imaging which showed disc herniations the past\par \par I have again referred her to physical therapy.\par I explained the importance of weight loss for spine health.\par \par I will see her back in 4 months.

## 2019-04-02 ENCOUNTER — APPOINTMENT (OUTPATIENT)
Dept: PULMONOLOGY | Facility: CLINIC | Age: 64
End: 2019-04-02
Payer: MEDICAID

## 2019-04-02 VITALS — HEART RATE: 98 BPM | DIASTOLIC BLOOD PRESSURE: 66 MMHG | OXYGEN SATURATION: 95 % | SYSTOLIC BLOOD PRESSURE: 126 MMHG

## 2019-04-02 VITALS — WEIGHT: 236 LBS | HEIGHT: 59.5 IN | BODY MASS INDEX: 46.95 KG/M2

## 2019-04-02 DIAGNOSIS — F25.0 SCHIZOAFFECTIVE DISORDER, BIPOLAR TYPE: ICD-10-CM

## 2019-04-02 DIAGNOSIS — G47.33 OBSTRUCTIVE SLEEP APNEA (ADULT) (PEDIATRIC): ICD-10-CM

## 2019-04-02 DIAGNOSIS — Z87.891 PERSONAL HISTORY OF NICOTINE DEPENDENCE: ICD-10-CM

## 2019-04-02 DIAGNOSIS — Z01.811 ENCOUNTER FOR PREPROCEDURAL RESPIRATORY EXAMINATION: ICD-10-CM

## 2019-04-02 DIAGNOSIS — J45.909 UNSPECIFIED ASTHMA, UNCOMPLICATED: ICD-10-CM

## 2019-04-02 PROCEDURE — 99214 OFFICE O/P EST MOD 30 MIN: CPT | Mod: 25

## 2019-04-02 PROCEDURE — 94729 DIFFUSING CAPACITY: CPT

## 2019-04-02 PROCEDURE — 94010 BREATHING CAPACITY TEST: CPT

## 2019-04-02 PROCEDURE — 85018 HEMOGLOBIN: CPT | Mod: QW

## 2019-04-02 PROCEDURE — 94727 GAS DIL/WSHOT DETER LNG VOL: CPT

## 2019-04-02 RX ORDER — OMEPRAZOLE 20 MG/1
20 CAPSULE, DELAYED RELEASE ORAL
Refills: 0 | Status: DISCONTINUED | COMMUNITY
End: 2019-04-02

## 2019-04-02 NOTE — CONSULT LETTER
[Dear  ___] : Dear  [unfilled], [Courtesy Letter:] : I had the pleasure of seeing your patient, [unfilled], in my office today. [Consult Closing:] : Thank you very much for allowing me to participate in the care of this patient.  If you have any questions, please do not hesitate to contact me. [DrYony  ___] : Dr. VALLADARES

## 2019-04-02 NOTE — ASSESSMENT
[FreeTextEntry1] : Severe NAYE; on BPAP. Says she is compliant. May need adjustment in pressure and new machine. Hx asthma, intermittent. PFT WNL. CXR WNL. Obesity.\par \par No pulmonary contraindications to planned D&C and hysteroscopy.

## 2019-04-02 NOTE — PROCEDURE
[FreeTextEntry1] : PFT: no obstruction. No restriction. DLCO elevated.\par \par CXR 3/6/19: no acute cardiopulmonary disease

## 2019-04-02 NOTE — PHYSICAL EXAM
[General Appearance - In No Acute Distress] : no acute distress [Normal Conjunctiva] : the conjunctiva exhibited no abnormalities [Normal Oropharynx] : abnormal oropharynx [Low Lying Soft Palate] : low lying soft palate [Elongated Uvula] : elongated uvula [Enlarged Base of the Tongue] : enlargement of the base of the tongue [III] : III [Neck Appearance] : the appearance of the neck was normal [Heart Rate And Rhythm] : heart rate and rhythm were normal [Heart Sounds] : normal S1 and S2 [] : no respiratory distress [Respiration, Rhythm And Depth] : normal respiratory rhythm and effort [Exaggerated Use Of Accessory Muscles For Inspiration] : no accessory muscle use [Auscultation Breath Sounds / Voice Sounds] : lungs were clear to auscultation bilaterally [Bowel Sounds] : normal bowel sounds [Abdomen Soft] : soft [FreeTextEntry1] : obese [Abnormal Walk] : normal gait [Nail Clubbing] : no clubbing of the fingernails [Cyanosis, Localized] : no localized cyanosis [Skin Color & Pigmentation] : normal skin color and pigmentation [No Focal Deficits] : no focal deficits [Oriented To Time, Place, And Person] : oriented to person, place, and time

## 2019-04-02 NOTE — HISTORY OF PRESENT ILLNESS
[AHI: ___ per hour] : Apnea-hypopnea index:  [unfilled] per hour [Date: ___] : the most recent therapeutic polysomnogram was completed [unfilled] [BPAP w/IPAP: ____ cmH2O] : BPAP with IPAP: [unfilled] cmH2O [BPAP w/EPAP: ___ cmH2O] : BPAP with EPAP: [unfilled] cmH2O [Obstructive Sleep Apnea] : obstructive sleep apnea [Daytime Somnolence] : daytime somnolence [FreeTextEntry1] : She is a poor historian. Not sure of what procedure she is having.\par \par Per chart, planned on D&C with Dr Leon on date TBD. \par \par Occasional WERNER. She says it has gotten better over the past few years. No wheeze, not much cough. Rarely uses ventolin or albuterol neb. \par \par

## 2019-04-16 ENCOUNTER — OUTPATIENT (OUTPATIENT)
Dept: OUTPATIENT SERVICES | Facility: HOSPITAL | Age: 64
LOS: 1 days | End: 2019-04-16
Payer: COMMERCIAL

## 2019-04-16 VITALS
TEMPERATURE: 97 F | SYSTOLIC BLOOD PRESSURE: 120 MMHG | HEART RATE: 100 BPM | DIASTOLIC BLOOD PRESSURE: 70 MMHG | HEIGHT: 59 IN | RESPIRATION RATE: 20 BRPM | WEIGHT: 236.12 LBS

## 2019-04-16 DIAGNOSIS — Z29.9 ENCOUNTER FOR PROPHYLACTIC MEASURES, UNSPECIFIED: ICD-10-CM

## 2019-04-16 DIAGNOSIS — Z01.818 ENCOUNTER FOR OTHER PREPROCEDURAL EXAMINATION: ICD-10-CM

## 2019-04-16 DIAGNOSIS — N95.9 UNSPECIFIED MENOPAUSAL AND PERIMENOPAUSAL DISORDER: ICD-10-CM

## 2019-04-16 DIAGNOSIS — Z13.89 ENCOUNTER FOR SCREENING FOR OTHER DISORDER: ICD-10-CM

## 2019-04-16 DIAGNOSIS — Z98.89 OTHER SPECIFIED POSTPROCEDURAL STATES: Chronic | ICD-10-CM

## 2019-04-16 LAB
ANION GAP SERPL CALC-SCNC: 12 MMOL/L — SIGNIFICANT CHANGE UP (ref 5–17)
BASOPHILS # BLD AUTO: 0 K/UL — SIGNIFICANT CHANGE UP (ref 0–0.2)
BASOPHILS NFR BLD AUTO: 0.1 % — SIGNIFICANT CHANGE UP (ref 0–2)
BLD GP AB SCN SERPL QL: SIGNIFICANT CHANGE UP
BUN SERPL-MCNC: 13 MG/DL — SIGNIFICANT CHANGE UP (ref 8–20)
CALCIUM SERPL-MCNC: 9.5 MG/DL — SIGNIFICANT CHANGE UP (ref 8.6–10.2)
CHLORIDE SERPL-SCNC: 100 MMOL/L — SIGNIFICANT CHANGE UP (ref 98–107)
CO2 SERPL-SCNC: 26 MMOL/L — SIGNIFICANT CHANGE UP (ref 22–29)
CREAT SERPL-MCNC: 0.64 MG/DL — SIGNIFICANT CHANGE UP (ref 0.5–1.3)
EOSINOPHIL # BLD AUTO: 0.1 K/UL — SIGNIFICANT CHANGE UP (ref 0–0.5)
EOSINOPHIL NFR BLD AUTO: 1.2 % — SIGNIFICANT CHANGE UP (ref 0–6)
GLUCOSE SERPL-MCNC: 118 MG/DL — HIGH (ref 70–115)
HBA1C BLD-MCNC: 5.7 % — HIGH (ref 4–5.6)
HCT VFR BLD CALC: 39.4 % — SIGNIFICANT CHANGE UP (ref 37–47)
HGB BLD-MCNC: 12.8 G/DL — SIGNIFICANT CHANGE UP (ref 12–16)
LYMPHOCYTES # BLD AUTO: 1.2 K/UL — SIGNIFICANT CHANGE UP (ref 1–4.8)
LYMPHOCYTES # BLD AUTO: 16.2 % — LOW (ref 20–55)
MCHC RBC-ENTMCNC: 29.1 PG — SIGNIFICANT CHANGE UP (ref 27–31)
MCHC RBC-ENTMCNC: 32.5 G/DL — SIGNIFICANT CHANGE UP (ref 32–36)
MCV RBC AUTO: 89.5 FL — SIGNIFICANT CHANGE UP (ref 81–99)
MONOCYTES # BLD AUTO: 0.5 K/UL — SIGNIFICANT CHANGE UP (ref 0–0.8)
MONOCYTES NFR BLD AUTO: 7.1 % — SIGNIFICANT CHANGE UP (ref 3–10)
NEUTROPHILS # BLD AUTO: 5.5 K/UL — SIGNIFICANT CHANGE UP (ref 1.8–8)
NEUTROPHILS NFR BLD AUTO: 75.1 % — HIGH (ref 37–73)
PLATELET # BLD AUTO: 225 K/UL — SIGNIFICANT CHANGE UP (ref 150–400)
POTASSIUM SERPL-MCNC: 4.3 MMOL/L — SIGNIFICANT CHANGE UP (ref 3.5–5.3)
POTASSIUM SERPL-SCNC: 4.3 MMOL/L — SIGNIFICANT CHANGE UP (ref 3.5–5.3)
RBC # BLD: 4.4 M/UL — SIGNIFICANT CHANGE UP (ref 4.4–5.2)
RBC # FLD: 13.7 % — SIGNIFICANT CHANGE UP (ref 11–15.6)
SODIUM SERPL-SCNC: 138 MMOL/L — SIGNIFICANT CHANGE UP (ref 135–145)
TYPE + AB SCN PNL BLD: SIGNIFICANT CHANGE UP
WBC # BLD: 7.4 K/UL — SIGNIFICANT CHANGE UP (ref 4.8–10.8)
WBC # FLD AUTO: 7.4 K/UL — SIGNIFICANT CHANGE UP (ref 4.8–10.8)

## 2019-04-16 PROCEDURE — 36415 COLL VENOUS BLD VENIPUNCTURE: CPT

## 2019-04-16 PROCEDURE — 86901 BLOOD TYPING SEROLOGIC RH(D): CPT

## 2019-04-16 PROCEDURE — 80048 BASIC METABOLIC PNL TOTAL CA: CPT

## 2019-04-16 PROCEDURE — 85027 COMPLETE CBC AUTOMATED: CPT

## 2019-04-16 PROCEDURE — 86900 BLOOD TYPING SEROLOGIC ABO: CPT

## 2019-04-16 PROCEDURE — 86850 RBC ANTIBODY SCREEN: CPT

## 2019-04-16 PROCEDURE — 83036 HEMOGLOBIN GLYCOSYLATED A1C: CPT

## 2019-04-16 PROCEDURE — G0463: CPT

## 2019-04-16 NOTE — H&P PST ADULT - BLOOD AVOIDANCE/RESTRICTIONS, PROFILE
Pt is Presyb[employer]ian, but states she is "studying" Jehovah Witness/Pentecostal beliefs Pt is Adventism, but states she is "studying" Jehovah Witness/Episcopalian beliefs/patient concern about blood borne infection

## 2019-04-16 NOTE — H&P PST ADULT - HISTORY OF PRESENT ILLNESS
This is a 63 y.o female who presents to [facility] today. This is a 63 y.o female who presents to PST today.  The pt reports she has been experiencing intermittent  post menopausal bleeding for the past month.  She is scheduled for a D & C in the near future.

## 2019-04-16 NOTE — H&P PST ADULT - ASSESSMENT

## 2019-04-16 NOTE — H&P PST ADULT - NSICDXPASTMEDICALHX_GEN_ALL_CORE_FT
PAST MEDICAL HISTORY:  Asthma     Diabetes     Drug abuse Last used when she was in her 20-30's    Hyperlipidemia     Hypertension     Hypothyroidism     Schizoaffective disorder, bipolar type     Sleep apnea

## 2019-04-16 NOTE — H&P PST ADULT - RS GEN PE MLT RESP DETAILS PC
airway patent/respirations non-labored/normal/diminished breath sounds, R/diminished breath sounds, L

## 2019-04-16 NOTE — H&P PST ADULT - TOBACCO FREE, LONGEST PERIOD, PROFILE
Pt states she smoked 2 cigarettes per day as a teenagere Pt states she smoked 2 cigarettes per day as a teenager

## 2019-04-16 NOTE — H&P PST ADULT - NSICDXPASTSURGICALHX_GEN_ALL_CORE_FT
PAST SURGICAL HISTORY:  H/O cardiac catheterization radially accessed; pt reports she has one stent    S/P

## 2019-04-17 PROBLEM — F19.10 OTHER PSYCHOACTIVE SUBSTANCE ABUSE, UNCOMPLICATED: Chronic | Status: ACTIVE | Noted: 2019-04-16

## 2019-04-23 ENCOUNTER — RESULT REVIEW (OUTPATIENT)
Age: 64
End: 2019-04-23

## 2019-04-23 ENCOUNTER — MOBILE ON CALL (OUTPATIENT)
Age: 64
End: 2019-04-23

## 2019-04-23 ENCOUNTER — INPATIENT (INPATIENT)
Facility: HOSPITAL | Age: 64
LOS: 0 days | Discharge: HOME | DRG: 745 | End: 2019-04-24
Attending: OBSTETRICS & GYNECOLOGY | Admitting: OBSTETRICS & GYNECOLOGY
Payer: COMMERCIAL

## 2019-04-23 VITALS
HEIGHT: 59 IN | RESPIRATION RATE: 13 BRPM | DIASTOLIC BLOOD PRESSURE: 64 MMHG | HEART RATE: 82 BPM | WEIGHT: 235.89 LBS | OXYGEN SATURATION: 97 % | TEMPERATURE: 97 F | SYSTOLIC BLOOD PRESSURE: 118 MMHG

## 2019-04-23 DIAGNOSIS — N95.9 UNSPECIFIED MENOPAUSAL AND PERIMENOPAUSAL DISORDER: ICD-10-CM

## 2019-04-23 DIAGNOSIS — Z98.89 OTHER SPECIFIED POSTPROCEDURAL STATES: Chronic | ICD-10-CM

## 2019-04-23 LAB
ABO RH CONFIRMATION: SIGNIFICANT CHANGE UP
GLUCOSE BLDC GLUCOMTR-MCNC: 106 MG/DL — HIGH (ref 70–99)

## 2019-04-23 PROCEDURE — 88341 IMHCHEM/IMCYTCHM EA ADD ANTB: CPT | Mod: 26

## 2019-04-23 PROCEDURE — 88305 TISSUE EXAM BY PATHOLOGIST: CPT | Mod: 26

## 2019-04-23 PROCEDURE — 88341 IMHCHEM/IMCYTCHM EA ADD ANTB: CPT | Mod: 26,59

## 2019-04-23 PROCEDURE — 88342 IMHCHEM/IMCYTCHM 1ST ANTB: CPT | Mod: 26

## 2019-04-23 PROCEDURE — 88342 IMHCHEM/IMCYTCHM 1ST ANTB: CPT | Mod: 26,59

## 2019-04-23 RX ORDER — LAMOTRIGINE 25 MG/1
100 TABLET, ORALLY DISINTEGRATING ORAL ONCE
Qty: 0 | Refills: 0 | Status: COMPLETED | OUTPATIENT
Start: 2019-04-23 | End: 2019-04-23

## 2019-04-23 RX ORDER — SODIUM CHLORIDE 9 MG/ML
1000 INJECTION, SOLUTION INTRAVENOUS
Qty: 0 | Refills: 0 | Status: DISCONTINUED | OUTPATIENT
Start: 2019-04-23 | End: 2019-04-23

## 2019-04-23 RX ORDER — SODIUM CHLORIDE 9 MG/ML
3 INJECTION INTRAMUSCULAR; INTRAVENOUS; SUBCUTANEOUS ONCE
Qty: 0 | Refills: 0 | Status: DISCONTINUED | OUTPATIENT
Start: 2019-04-23 | End: 2019-04-23

## 2019-04-23 RX ORDER — ENOXAPARIN SODIUM 100 MG/ML
40 INJECTION SUBCUTANEOUS
Qty: 11.2 | Refills: 0 | OUTPATIENT
Start: 2019-04-23 | End: 2019-05-20

## 2019-04-23 RX ORDER — QUETIAPINE FUMARATE 200 MG/1
200 TABLET, FILM COATED ORAL ONCE
Qty: 0 | Refills: 0 | Status: COMPLETED | OUTPATIENT
Start: 2019-04-23 | End: 2019-04-23

## 2019-04-23 RX ORDER — ONDANSETRON 8 MG/1
4 TABLET, FILM COATED ORAL ONCE
Qty: 0 | Refills: 0 | Status: DISCONTINUED | OUTPATIENT
Start: 2019-04-23 | End: 2019-04-24

## 2019-04-23 RX ORDER — SIMVASTATIN 20 MG/1
20 TABLET, FILM COATED ORAL AT BEDTIME
Qty: 0 | Refills: 0 | Status: DISCONTINUED | OUTPATIENT
Start: 2019-04-23 | End: 2019-04-24

## 2019-04-23 RX ORDER — FENTANYL CITRATE 50 UG/ML
25 INJECTION INTRAVENOUS
Qty: 0 | Refills: 0 | Status: DISCONTINUED | OUTPATIENT
Start: 2019-04-23 | End: 2019-04-23

## 2019-04-23 RX ORDER — HALOPERIDOL DECANOATE 100 MG/ML
5 INJECTION INTRAMUSCULAR ONCE
Qty: 0 | Refills: 0 | Status: COMPLETED | OUTPATIENT
Start: 2019-04-23 | End: 2019-04-23

## 2019-04-23 RX ORDER — ACETAMINOPHEN 500 MG
650 TABLET ORAL EVERY 6 HOURS
Qty: 0 | Refills: 0 | Status: DISCONTINUED | OUTPATIENT
Start: 2019-04-23 | End: 2019-04-24

## 2019-04-23 RX ADMIN — LAMOTRIGINE 100 MILLIGRAM(S): 25 TABLET, ORALLY DISINTEGRATING ORAL at 22:41

## 2019-04-23 RX ADMIN — SIMVASTATIN 20 MILLIGRAM(S): 20 TABLET, FILM COATED ORAL at 22:41

## 2019-04-23 RX ADMIN — HALOPERIDOL DECANOATE 5 MILLIGRAM(S): 100 INJECTION INTRAMUSCULAR at 22:41

## 2019-04-23 RX ADMIN — QUETIAPINE FUMARATE 200 MILLIGRAM(S): 200 TABLET, FILM COATED ORAL at 22:41

## 2019-04-23 NOTE — PATIENT PROFILE ADULT - BRADEN ACTIVITY
Clinic Care Coordination Contact 1/6/17  Care Team Conversations-Social Work    Received a phone call from the pt's  today stating he has been on the phone all afternoon with [facility], the [facility], etc trying to straighten out his insurance. He states both [facility] and [facility] claim to not have the application or knowledge of it and advise him to call the [facility]. When the pt called the [facility] site, the stated he needs to call the [facility]. Pt is frustrated. This writer connected him to the financial worker here at [facility] to assist him. This writer will check in with the pt in approx 2 weeks to see how things are going with the application.    ESTEPHANIE Henderson  Care Coordinator Social Work    [facility]  [phone]  1/6/2017 3:34 PM            
(3) walks occasionally

## 2019-04-23 NOTE — ASU DISCHARGE PLAN (ADULT/PEDIATRIC) - CARE PROVIDER_API CALL
Shruthi Leon)  [facility]  [address] [place] [address]  Phone: [phone]  Fax: [phone]  Follow Up Time:

## 2019-04-24 ENCOUNTER — TRANSCRIPTION ENCOUNTER (OUTPATIENT)
Age: 64
End: 2019-04-24

## 2019-04-24 VITALS
TEMPERATURE: 98 F | SYSTOLIC BLOOD PRESSURE: 110 MMHG | HEART RATE: 82 BPM | DIASTOLIC BLOOD PRESSURE: 68 MMHG | OXYGEN SATURATION: 96 % | RESPIRATION RATE: 18 BRPM

## 2019-04-24 NOTE — DISCHARGE NOTE NURSING/CASE MANAGEMENT/SOCIAL WORK - NSDCDPATPORTLINK_GEN_ALL_CORE
You can access the [facility] Patient Portal, offered by [facility], by registering with the following website: [email or web address]

## 2019-04-26 LAB — SURGICAL PATHOLOGY STUDY: SIGNIFICANT CHANGE UP

## 2019-05-03 PROBLEM — G47.30 SLEEP APNEA, UNSPECIFIED: Chronic | Status: ACTIVE | Noted: 2019-04-16

## 2019-05-03 PROBLEM — E78.5 HYPERLIPIDEMIA, UNSPECIFIED: Chronic | Status: ACTIVE | Noted: 2019-04-16

## 2019-05-03 PROBLEM — E03.9 HYPOTHYROIDISM, UNSPECIFIED: Chronic | Status: ACTIVE | Noted: 2019-04-16

## 2019-05-03 PROBLEM — J45.909 UNSPECIFIED ASTHMA, UNCOMPLICATED: Chronic | Status: ACTIVE | Noted: 2019-04-16

## 2019-05-16 ENCOUNTER — APPOINTMENT (OUTPATIENT)
Dept: GYNECOLOGIC ONCOLOGY | Facility: CLINIC | Age: 64
End: 2019-05-16
Payer: MEDICAID

## 2019-05-16 PROCEDURE — 99212 OFFICE O/P EST SF 10 MIN: CPT

## 2019-05-16 RX ORDER — METRONIDAZOLE 500 MG/1
500 TABLET ORAL TWICE DAILY
Qty: 10 | Refills: 0 | Status: ACTIVE | COMMUNITY
Start: 2019-05-16 | End: 1900-01-01

## 2019-05-16 NOTE — ASSESSMENT
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 Bacterial vaginosis after D&C.

## 2019-05-16 NOTE — REASON FOR VISIT
[Post Op] : post op visit [de-identified] : 4/23/2019 [de-identified] : D&C [de-identified] : Pt presents to discuss results of D&C. She reports doing well overall. She has some foul smelling discharge, but no bleeding.

## 2019-05-16 NOTE — END OF VISIT
[FreeTextEntry3] : TRH, BSO, SLND, possible pelvic and para-aortic lymphadenectomy, cystoscopy \par Pre-op testing\par CT chest/abdomen/pelvis\par Flatyl for bacteria vaginosis

## 2019-05-16 NOTE — DISCUSSION/SUMMARY
[Firm] : soft [Tender] : nontender [Rebound] : no rebound tenderness [Abnormal Bowel Sounds] : normal bowel sounds [Guarding] : no guarding [Incisional Hernia] : no incisional hernia [Mass] : no palpable mass

## 2019-05-30 ENCOUNTER — RX RENEWAL (OUTPATIENT)
Age: 64
End: 2019-05-30

## 2019-05-30 ENCOUNTER — OUTPATIENT (OUTPATIENT)
Dept: OUTPATIENT SERVICES | Facility: HOSPITAL | Age: 64
LOS: 1 days | End: 2019-05-30
Payer: COMMERCIAL

## 2019-05-30 VITALS
TEMPERATURE: 100 F | SYSTOLIC BLOOD PRESSURE: 130 MMHG | HEART RATE: 100 BPM | WEIGHT: 238.1 LBS | HEIGHT: 62 IN | RESPIRATION RATE: 18 BRPM | DIASTOLIC BLOOD PRESSURE: 79 MMHG

## 2019-05-30 DIAGNOSIS — Z98.89 OTHER SPECIFIED POSTPROCEDURAL STATES: Chronic | ICD-10-CM

## 2019-05-30 DIAGNOSIS — Z01.818 ENCOUNTER FOR OTHER PREPROCEDURAL EXAMINATION: ICD-10-CM

## 2019-05-30 DIAGNOSIS — C54.1 MALIGNANT NEOPLASM OF ENDOMETRIUM: ICD-10-CM

## 2019-05-30 DIAGNOSIS — Z98.890 OTHER SPECIFIED POSTPROCEDURAL STATES: Chronic | ICD-10-CM

## 2019-05-30 DIAGNOSIS — E11.9 TYPE 2 DIABETES MELLITUS WITHOUT COMPLICATIONS: ICD-10-CM

## 2019-05-30 DIAGNOSIS — I10 ESSENTIAL (PRIMARY) HYPERTENSION: ICD-10-CM

## 2019-05-30 LAB
ANION GAP SERPL CALC-SCNC: 13 MMOL/L — SIGNIFICANT CHANGE UP (ref 5–17)
BASOPHILS # BLD AUTO: 0 K/UL — SIGNIFICANT CHANGE UP (ref 0–0.2)
BASOPHILS NFR BLD AUTO: 0.1 % — SIGNIFICANT CHANGE UP (ref 0–2)
BLD GP AB SCN SERPL QL: SIGNIFICANT CHANGE UP
BUN SERPL-MCNC: 8 MG/DL — SIGNIFICANT CHANGE UP (ref 8–20)
CALCIUM SERPL-MCNC: 10.2 MG/DL — SIGNIFICANT CHANGE UP (ref 8.6–10.2)
CANCER AG125 SERPL-ACNC: 13 U/ML — SIGNIFICANT CHANGE UP
CHLORIDE SERPL-SCNC: 91 MMOL/L — LOW (ref 98–107)
CO2 SERPL-SCNC: 26 MMOL/L — SIGNIFICANT CHANGE UP (ref 22–29)
CREAT SERPL-MCNC: 0.59 MG/DL — SIGNIFICANT CHANGE UP (ref 0.5–1.3)
EOSINOPHIL # BLD AUTO: 0.1 K/UL — SIGNIFICANT CHANGE UP (ref 0–0.5)
EOSINOPHIL NFR BLD AUTO: 1 % — SIGNIFICANT CHANGE UP (ref 0–6)
GLUCOSE SERPL-MCNC: 95 MG/DL — SIGNIFICANT CHANGE UP (ref 70–115)
HBA1C BLD-MCNC: 5.6 % — SIGNIFICANT CHANGE UP (ref 4–5.6)
HCT VFR BLD CALC: 38.1 % — SIGNIFICANT CHANGE UP (ref 37–47)
HGB BLD-MCNC: 13.1 G/DL — SIGNIFICANT CHANGE UP (ref 12–16)
LYMPHOCYTES # BLD AUTO: 1.3 K/UL — SIGNIFICANT CHANGE UP (ref 1–4.8)
LYMPHOCYTES # BLD AUTO: 18.9 % — LOW (ref 20–55)
MCHC RBC-ENTMCNC: 29.8 PG — SIGNIFICANT CHANGE UP (ref 27–31)
MCHC RBC-ENTMCNC: 34.4 G/DL — SIGNIFICANT CHANGE UP (ref 32–36)
MCV RBC AUTO: 86.8 FL — SIGNIFICANT CHANGE UP (ref 81–99)
MONOCYTES # BLD AUTO: 0.6 K/UL — SIGNIFICANT CHANGE UP (ref 0–0.8)
MONOCYTES NFR BLD AUTO: 8.5 % — SIGNIFICANT CHANGE UP (ref 3–10)
NEUTROPHILS # BLD AUTO: 4.9 K/UL — SIGNIFICANT CHANGE UP (ref 1.8–8)
NEUTROPHILS NFR BLD AUTO: 71.1 % — SIGNIFICANT CHANGE UP (ref 37–73)
PLATELET # BLD AUTO: 267 K/UL — SIGNIFICANT CHANGE UP (ref 150–400)
POTASSIUM SERPL-MCNC: 4.3 MMOL/L — SIGNIFICANT CHANGE UP (ref 3.5–5.3)
POTASSIUM SERPL-SCNC: 4.3 MMOL/L — SIGNIFICANT CHANGE UP (ref 3.5–5.3)
RBC # BLD: 4.39 M/UL — LOW (ref 4.4–5.2)
RBC # FLD: 13.9 % — SIGNIFICANT CHANGE UP (ref 11–15.6)
SODIUM SERPL-SCNC: 130 MMOL/L — LOW (ref 135–145)
TYPE + AB SCN PNL BLD: SIGNIFICANT CHANGE UP
WBC # BLD: 6.9 K/UL — SIGNIFICANT CHANGE UP (ref 4.8–10.8)
WBC # FLD AUTO: 6.9 K/UL — SIGNIFICANT CHANGE UP (ref 4.8–10.8)

## 2019-05-30 PROCEDURE — 85027 COMPLETE CBC AUTOMATED: CPT

## 2019-05-30 PROCEDURE — 80048 BASIC METABOLIC PNL TOTAL CA: CPT

## 2019-05-30 PROCEDURE — G0463: CPT

## 2019-05-30 PROCEDURE — 86850 RBC ANTIBODY SCREEN: CPT

## 2019-05-30 PROCEDURE — 86901 BLOOD TYPING SEROLOGIC RH(D): CPT

## 2019-05-30 PROCEDURE — 86304 IMMUNOASSAY TUMOR CA 125: CPT

## 2019-05-30 PROCEDURE — 36415 COLL VENOUS BLD VENIPUNCTURE: CPT

## 2019-05-30 PROCEDURE — 71046 X-RAY EXAM CHEST 2 VIEWS: CPT | Mod: 26

## 2019-05-30 PROCEDURE — 83036 HEMOGLOBIN GLYCOSYLATED A1C: CPT

## 2019-05-30 PROCEDURE — 86900 BLOOD TYPING SEROLOGIC ABO: CPT

## 2019-05-30 PROCEDURE — 71046 X-RAY EXAM CHEST 2 VIEWS: CPT

## 2019-05-30 RX ORDER — CEFOTETAN DISODIUM 1 G
2 VIAL (EA) INJECTION ONCE
Refills: 0 | Status: DISCONTINUED | OUTPATIENT
Start: 2019-06-04 | End: 2019-06-05

## 2019-05-30 RX ORDER — MAGNESIUM CITRATE
SOLUTION, ORAL ORAL
Qty: 2 | Refills: 0 | Status: ACTIVE | COMMUNITY
Start: 2019-05-30 | End: 1900-01-01

## 2019-05-30 RX ORDER — SODIUM CHLORIDE 9 MG/ML
3 INJECTION INTRAMUSCULAR; INTRAVENOUS; SUBCUTANEOUS ONCE
Refills: 0 | Status: DISCONTINUED | OUTPATIENT
Start: 2019-06-04 | End: 2019-06-04

## 2019-05-30 NOTE — H&P PST ADULT - NSICDXPASTMEDICALHX_GEN_ALL_CORE_FT
PAST MEDICAL HISTORY:  Asthma     Diabetes     Drug abuse Last used when she was in her 20-30's    Endometrial cancer     Hyperlipidemia     Hypertension     Hypothyroidism     Schizoaffective disorder, bipolar type     Sleep apnea

## 2019-05-30 NOTE — H&P PST ADULT - NSICDXPROBLEM_GEN_ALL_CORE_FT
PROBLEM DIAGNOSES  Problem: Hypertension  Assessment and Plan:     Problem: Diabetes  Assessment and Plan: PROBLEM DIAGNOSES  Problem: Hypertension  Assessment and Plan: continue medication as directed  will obtain cardiac clearance    Problem: Diabetes  Assessment and Plan: routine labs   continue medication as directed  medical clearance pending    Problem: Endometrial cancer  Assessment and Plan: Robotic assisted total laparoscopic hysterectomy, bilateral salpingo- oophorectomy, sentinel lymph node dissection, pelvic para aortic lymphandectomy, cystoscopy

## 2019-05-30 NOTE — H&P PST ADULT - NSICDXPASTSURGICALHX_GEN_ALL_CORE_FT
PAST SURGICAL HISTORY:  H/O cardiac catheterization radially accessed; pt reports she has one stent    S/P      S/P dilation and curettage

## 2019-05-30 NOTE — H&P PST ADULT - ASSESSMENT
Pt is a 64 yo female present to pst with  malignant neoplasm of the endometrium noted on 4/23/19 D&C.  She is now schedule for robotic assisted total laparoscopic hysterectomy, bilateral salpingo- oophorectomy, sentinel lymph node dissection, pelvic para aortic lymphadenotomy cystoscopy Pt is a 64 yo female present to pst with  malignant neoplasm of the endometrium noted on 19 D&C.  She is now schedule for robotic assisted total laparoscopic hysterectomy, bilateral salpingo- oophorectomy, sentinel lymph node dissection, pelvic para aortic lymphadenotomy cystoscopy     Assessment and Plan:  Assessment:  - Assessment   CAPRINI VTE 2.0 SCORE [CLOT updated 2019]    AGE RELATED RISK FACTORS  MOBILITY RELATED FACTORS  [ ] Age 41-60 years (1 Point)  [ ] Bed rest  (1 Point)  [x ] Age: 61-74 years (2 Points)  [ ] Plaster cast  (2 Points)  [ ] Age= 75 years (3 Points)  [ ] Bed bound for more than 72 hours (2 Points)    DISEASE RELATED RISK FACTORS  GENDER SPECIFIC FACTORS  [ ] Edema in the lower extremities (1 Point)  [ ] Pregnancy (1 Point)  [ ] Varicose veins (1 Point)  [ ] Post-partum < 6 weeks (1  Point)  [x ] BMI > 25 Kg/m2 (1 Point)  [ ] Hormonal therapy or oral contraception (1 Point)    [ ] Sepsis (in the previous month) (1 Point)  [ ] History of pregnancy complications (1 point)  [ ] Pneumonia or serious lung disease  [ ] Unexplained or recurrent  (1 Point)  (in the previous month) (1 Point)  [ ] Abnormal pulmonary function test (1 Point)  SURGERY RELATED RISK FACTORS  [ ] Acute myocardial infarction (1 Point)  [ ]  Section (1 Point)  [ ] Congestive heart failure (in the previous month) (1 Point) [ ] Minor  surgery (1 Point)  [ ] Inflammatory bowel disease (1 Point)  [ ] Arthroscopic surgery (2 Points)  [ ] Central venous access (2 Points)  [x ] General surgery lasting more than 45 minutes (2 points)  [ ] Malignancy- Present or previous (2 Points)  [ ] Elective arthroplasty (5 points)  [ ] Stroke (in the previous month) (5 Points)        HEMATOLOGY RELATED FACTORS  TRAUMA RELATED RISK FACTORS  [ ] Prior episodes of VTE (3 Points)  [ ] Fracture of the hip, pelvis, or leg (5  Points)  [ ] Positive family history for VTE (3 Points)  [ ] Acute spinal cord injury (in the previous month) (5 Points)  [ ] Prothrombin 52323 A (3 Points)  [ ] Paralysis (less than 1 month) (5 Points)  [ ] Factor V Leiden (3 Points)  [ ] Multiple Trauma within 1 month (5  Points)  [ ] Lupus anticoagulants (3 Points)    [ ] Anticardiolipin antibodies (3 Points)    [ ] High homocysteine in the blood (3 Points)    [ ] Other congenital or acquired thrombophilia (3 Points)    [ ] Heparin induced thrombocytopenia (3 Points)      Total Score [ 5 ]    OPIOID RISK TOOL    TRESA EACH BOX THAT APPLIES AND ADD TOTALS AT THE END    FAMILY HISTORY OF SUBSTANCE ABUSE FEMALE MALE  Alcohol  [ x ] 1 pt [ ] 3pts  Illegal Drugs [  x ] 2 pts [ ] 3pts  Rx Drugs  [ ]4 pts [ ] 4 pts    PERSONAL HISTORY OF SUBSTANCE ABUSE  Alcohol  [ ] 3 pts [ ] 3 pts  Illegal Drugs [  x ] 4 pts [ ] 4 pts  Rx Drugs  [ ] 5 pts [ ] 5 pts    AGE BETWEEN 16-45 YEARS [ ] 1 pt  [ ] 1 pt    HISTORY OF PREADOLESCENT  SEXUAL ABUSE [ ]  3 pts [ ] 0pts    PSYCHOLOGICAL DISEASE  ADD, OCD, Bipolar, Schizophrenia [ x ] 2 pts  [ ] 2 pts  Depression [  ] 1 pt [ ] 1 pt    SCORING TOTAL (add numbers and type here) ( 9 )      A score of 3 or lower indicated LOW risk for future opioid abuse  A score of 4 to 7 indicated moderate risk for future opioid abuse  A score of 8 or higher indicates a high risk for opioid abuse Pt is a 62 yo female present to pst with  malignant neoplasm of the endometrium noted on 19 D&C.  She is now schedule for robotic assisted total laparoscopic hysterectomy, bilateral salpingo- oophorectomy, sentinel lymph node dissection, pelvic para aortic lymphadenotomy cystoscopy     HILDAI VTE 2.0 SCORE [CLOT updated 2019]    AGE RELATED RISK FACTORS  MOBILITY RELATED FACTORS  [ ] Age 41-60 years (1 Point)  [ ] Bed rest  (1 Point)  [x ] Age: 61-74 years (2 Points)  [ ] Plaster cast  (2 Points)  [ ] Age= 75 years (3 Points)  [ ] Bed bound for more than 72 hours (2 Points)    DISEASE RELATED RISK FACTORS  GENDER SPECIFIC FACTORS  [ ] Edema in the lower extremities (1 Point)  [ ] Pregnancy (1 Point)  [ ] Varicose veins (1 Point)  [ ] Post-partum < 6 weeks (1  Point)  [x ] BMI > 25 Kg/m2 (1 Point)  [ ] Hormonal therapy or oral contraception (1 Point)    [ ] Sepsis (in the previous month) (1 Point)  [ ] History of pregnancy complications (1 point)  [ ] Pneumonia or serious lung disease  [ ] Unexplained or recurrent  (1 Point)  (in the previous month) (1 Point)  [ ] Abnormal pulmonary function test (1 Point)  SURGERY RELATED RISK FACTORS  [ ] Acute myocardial infarction (1 Point)  [ ]  Section (1 Point)  [ ] Congestive heart failure (in the previous month) (1 Point) [ ] Minor  surgery (1 Point)  [ ] Inflammatory bowel disease (1 Point)  [ ] Arthroscopic surgery (2 Points)  [ ] Central venous access (2 Points)  [x ] General surgery lasting more than 45 minutes (2 points)  [ ] Malignancy- Present or previous (2 Points)  [ ] Elective arthroplasty (5 points)  [ ] Stroke (in the previous month) (5 Points)        HEMATOLOGY RELATED FACTORS  TRAUMA RELATED RISK FACTORS  [ ] Prior episodes of VTE (3 Points)  [ ] Fracture of the hip, pelvis, or leg (5  Points)  [ ] Positive family history for VTE (3 Points)  [ ] Acute spinal cord injury (in the previous month) (5 Points)  [ ] Prothrombin 43021 A (3 Points)  [ ] Paralysis (less than 1 month) (5 Points)  [ ] Factor V Leiden (3 Points)  [ ] Multiple Trauma within 1 month (5  Points)  [ ] Lupus anticoagulants (3 Points)    [ ] Anticardiolipin antibodies (3 Points)    [ ] High homocysteine in the blood (3 Points)    [ ] Other congenital or acquired thrombophilia (3 Points)    [ ] Heparin induced thrombocytopenia (3 Points)      Total Score [ 5 ]    OPIOID RISK TOOL    TRESA EACH BOX THAT APPLIES AND ADD TOTALS AT THE END    FAMILY HISTORY OF SUBSTANCE ABUSE FEMALE MALE  Alcohol  [ x ] 1 pt [ ] 3pts  Illegal Drugs [  x ] 2 pts [ ] 3pts  Rx Drugs  [ ]4 pts [ ] 4 pts    PERSONAL HISTORY OF SUBSTANCE ABUSE  Alcohol  [ ] 3 pts [ ] 3 pts  Illegal Drugs [  x ] 4 pts [ ] 4 pts  Rx Drugs  [ ] 5 pts [ ] 5 pts    AGE BETWEEN 16-45 YEARS [ ] 1 pt  [ ] 1 pt    HISTORY OF PREADOLESCENT  SEXUAL ABUSE [ ]  3 pts [ ] 0pts    PSYCHOLOGICAL DISEASE  ADD, OCD, Bipolar, Schizophrenia [ x ] 2 pts  [ ] 2 pts  Depression [  ] 1 pt [ ] 1 pt    SCORING TOTAL (add numbers and type here) ( 9 )      A score of 3 or lower indicated LOW risk for future opioid abuse  A score of 4 to 7 indicated moderate risk for future opioid abuse  A score of 8 or higher indicates a high risk for opioid abuse

## 2019-05-30 NOTE — H&P PST ADULT - BLOOD AVOIDANCE/RESTRICTIONS, PROFILE
Baptism beliefs/Pt is Anabaptism, but states she is "studying" Jehovah Witness but state okay to blood products if she needs it/patient concern about blood borne infection

## 2019-05-30 NOTE — H&P PST ADULT - HISTORY OF PRESENT ILLNESS
Pt is a 62 yo female present to pst with  malignant neoplasm of the endometrium noted on 4/23/19 D&C.  She is now schedule for robotic assisted total laparoscopic hysterectomy, bilateral salpingo- oophorectomy, sentinel lymph node dissection, pelvic para aortic lymphadenotomy cystoscopy

## 2019-05-30 NOTE — ASU PATIENT PROFILE, ADULT - BLOOD AVOIDANCE/RESTRICTIONS, PROFILE
Jew beliefs/patient concern about blood borne infection/Pt is Religion, but states she [facility]

## 2019-05-30 NOTE — ASU PATIENT PROFILE, ADULT - PMH
Asthma    Diabetes    Drug abuse  Last used when she was in her 20-30's  Hyperlipidemia    Hypertension    Hypothyroidism    Schizoaffective disorder, bipolar type    Sleep apnea

## 2019-06-04 ENCOUNTER — TRANSCRIPTION ENCOUNTER (OUTPATIENT)
Age: 64
End: 2019-06-04

## 2019-06-04 ENCOUNTER — RESULT REVIEW (OUTPATIENT)
Age: 64
End: 2019-06-04

## 2019-06-04 ENCOUNTER — INPATIENT (INPATIENT)
Facility: HOSPITAL | Age: 64
LOS: 0 days | Discharge: HOME | DRG: 741 | End: 2019-06-05
Attending: OBSTETRICS & GYNECOLOGY | Admitting: OBSTETRICS & GYNECOLOGY
Payer: COMMERCIAL

## 2019-06-04 VITALS
HEART RATE: 95 BPM | OXYGEN SATURATION: 97 % | SYSTOLIC BLOOD PRESSURE: 121 MMHG | TEMPERATURE: 98 F | RESPIRATION RATE: 16 BRPM | DIASTOLIC BLOOD PRESSURE: 71 MMHG | WEIGHT: 238.1 LBS | HEIGHT: 62 IN

## 2019-06-04 DIAGNOSIS — Z98.890 OTHER SPECIFIED POSTPROCEDURAL STATES: Chronic | ICD-10-CM

## 2019-06-04 DIAGNOSIS — Z01.818 ENCOUNTER FOR OTHER PREPROCEDURAL EXAMINATION: ICD-10-CM

## 2019-06-04 DIAGNOSIS — Z98.89 OTHER SPECIFIED POSTPROCEDURAL STATES: Chronic | ICD-10-CM

## 2019-06-04 DIAGNOSIS — C54.1 MALIGNANT NEOPLASM OF ENDOMETRIUM: ICD-10-CM

## 2019-06-04 LAB
GLUCOSE BLDC GLUCOMTR-MCNC: 100 MG/DL — HIGH (ref 70–99)
GLUCOSE BLDC GLUCOMTR-MCNC: 106 MG/DL — HIGH (ref 70–99)
GLUCOSE BLDC GLUCOMTR-MCNC: 119 MG/DL — HIGH (ref 70–99)
GLUCOSE BLDC GLUCOMTR-MCNC: 156 MG/DL — HIGH (ref 70–99)
GLUCOSE BLDC GLUCOMTR-MCNC: 96 MG/DL — SIGNIFICANT CHANGE UP (ref 70–99)

## 2019-06-04 PROCEDURE — 38900 IO MAP OF SENT LYMPH NODE: CPT | Mod: 80

## 2019-06-04 PROCEDURE — 58571 TLH W/T/O 250 G OR LESS: CPT

## 2019-06-04 PROCEDURE — 88307 TISSUE EXAM BY PATHOLOGIST: CPT | Mod: 26

## 2019-06-04 PROCEDURE — 49084 PERITONEAL LAVAGE: CPT | Mod: 59

## 2019-06-04 PROCEDURE — 88305 TISSUE EXAM BY PATHOLOGIST: CPT | Mod: 26,59

## 2019-06-04 PROCEDURE — 58571 TLH W/T/O 250 G OR LESS: CPT | Mod: 80

## 2019-06-04 PROCEDURE — 88341 IMHCHEM/IMCYTCHM EA ADD ANTB: CPT | Mod: 26

## 2019-06-04 PROCEDURE — 88112 CYTOPATH CELL ENHANCE TECH: CPT | Mod: 26

## 2019-06-04 PROCEDURE — 57425 LAPAROSCOPY SURG COLPOPEXY: CPT | Mod: 59

## 2019-06-04 PROCEDURE — 88309 TISSUE EXAM BY PATHOLOGIST: CPT | Mod: 26

## 2019-06-04 PROCEDURE — 52000 CYSTOURETHROSCOPY: CPT | Mod: 59

## 2019-06-04 PROCEDURE — 88305 TISSUE EXAM BY PATHOLOGIST: CPT | Mod: 26

## 2019-06-04 PROCEDURE — 88342 IMHCHEM/IMCYTCHM 1ST ANTB: CPT | Mod: 26

## 2019-06-04 PROCEDURE — 38900 IO MAP OF SENT LYMPH NODE: CPT | Mod: 50

## 2019-06-04 PROCEDURE — 57425 LAPAROSCOPY SURG COLPOPEXY: CPT | Mod: 80,59

## 2019-06-04 PROCEDURE — 38571 LAPAROSCOPY LYMPHADENECTOMY: CPT | Mod: 59

## 2019-06-04 PROCEDURE — 38571 LAPAROSCOPY LYMPHADENECTOMY: CPT | Mod: 80,59

## 2019-06-04 RX ORDER — LEVOTHYROXINE SODIUM 125 MCG
1 TABLET ORAL
Qty: 0 | Refills: 0 | DISCHARGE

## 2019-06-04 RX ORDER — BENZTROPINE MESYLATE 1 MG
3 TABLET ORAL
Qty: 0 | Refills: 0 | DISCHARGE

## 2019-06-04 RX ORDER — SIMVASTATIN 20 MG/1
1 TABLET, FILM COATED ORAL
Qty: 0 | Refills: 0 | DISCHARGE

## 2019-06-04 RX ORDER — OXYCODONE AND ACETAMINOPHEN 5; 325 MG/1; MG/1
2 TABLET ORAL EVERY 4 HOURS
Refills: 0 | Status: DISCONTINUED | OUTPATIENT
Start: 2019-06-04 | End: 2019-06-05

## 2019-06-04 RX ORDER — MONTELUKAST 4 MG/1
10 TABLET, CHEWABLE ORAL DAILY
Refills: 0 | Status: DISCONTINUED | OUTPATIENT
Start: 2019-06-04 | End: 2019-06-05

## 2019-06-04 RX ORDER — LISINOPRIL 2.5 MG/1
20 TABLET ORAL DAILY
Refills: 0 | Status: DISCONTINUED | OUTPATIENT
Start: 2019-06-04 | End: 2019-06-04

## 2019-06-04 RX ORDER — FLUTICASONE PROPIONATE 220 MCG
2 AEROSOL WITH ADAPTER (GRAM) INHALATION
Qty: 0 | Refills: 0 | DISCHARGE

## 2019-06-04 RX ORDER — METFORMIN HYDROCHLORIDE 850 MG/1
1 TABLET ORAL
Qty: 0 | Refills: 0 | DISCHARGE

## 2019-06-04 RX ORDER — LAMOTRIGINE 25 MG/1
1 TABLET, ORALLY DISINTEGRATING ORAL
Qty: 0 | Refills: 0 | DISCHARGE

## 2019-06-04 RX ORDER — LEVOTHYROXINE SODIUM 125 MCG
100 TABLET ORAL DAILY
Refills: 0 | Status: DISCONTINUED | OUTPATIENT
Start: 2019-06-04 | End: 2019-06-05

## 2019-06-04 RX ORDER — DEXTROSE 50 % IN WATER 50 %
15 SYRINGE (ML) INTRAVENOUS ONCE
Refills: 0 | Status: DISCONTINUED | OUTPATIENT
Start: 2019-06-04 | End: 2019-06-05

## 2019-06-04 RX ORDER — MONTELUKAST 4 MG/1
1 TABLET, CHEWABLE ORAL
Qty: 0 | Refills: 0 | DISCHARGE

## 2019-06-04 RX ORDER — LISINOPRIL 2.5 MG/1
20 TABLET ORAL DAILY
Refills: 0 | Status: DISCONTINUED | OUTPATIENT
Start: 2019-06-04 | End: 2019-06-05

## 2019-06-04 RX ORDER — ONDANSETRON 8 MG/1
4 TABLET, FILM COATED ORAL EVERY 6 HOURS
Refills: 0 | Status: DISCONTINUED | OUTPATIENT
Start: 2019-06-04 | End: 2019-06-05

## 2019-06-04 RX ORDER — HALOPERIDOL DECANOATE 100 MG/ML
125 INJECTION INTRAMUSCULAR
Qty: 0 | Refills: 0 | DISCHARGE

## 2019-06-04 RX ORDER — SODIUM CHLORIDE 9 MG/ML
1000 INJECTION, SOLUTION INTRAVENOUS
Refills: 0 | Status: DISCONTINUED | OUTPATIENT
Start: 2019-06-04 | End: 2019-06-04

## 2019-06-04 RX ORDER — ENOXAPARIN SODIUM 100 MG/ML
40 INJECTION SUBCUTANEOUS
Qty: 28 | Refills: 0
Start: 2019-06-04 | End: 2019-07-01

## 2019-06-04 RX ORDER — ALBUTEROL 90 UG/1
0 AEROSOL, METERED ORAL
Qty: 0 | Refills: 0 | DISCHARGE

## 2019-06-04 RX ORDER — ENOXAPARIN SODIUM 100 MG/ML
40 INJECTION SUBCUTANEOUS ONCE
Refills: 0 | Status: COMPLETED | OUTPATIENT
Start: 2019-06-04 | End: 2019-06-04

## 2019-06-04 RX ORDER — QUETIAPINE FUMARATE 200 MG/1
0 TABLET, FILM COATED ORAL
Qty: 0 | Refills: 0 | DISCHARGE

## 2019-06-04 RX ORDER — ONDANSETRON 8 MG/1
4 TABLET, FILM COATED ORAL ONCE
Refills: 0 | Status: DISCONTINUED | OUTPATIENT
Start: 2019-06-04 | End: 2019-06-04

## 2019-06-04 RX ORDER — ALBUTEROL 90 UG/1
1 AEROSOL, METERED ORAL DAILY
Refills: 0 | Status: DISCONTINUED | OUTPATIENT
Start: 2019-06-04 | End: 2019-06-05

## 2019-06-04 RX ORDER — LAMOTRIGINE 25 MG/1
100 TABLET, ORALLY DISINTEGRATING ORAL
Refills: 0 | Status: DISCONTINUED | OUTPATIENT
Start: 2019-06-04 | End: 2019-06-05

## 2019-06-04 RX ORDER — HALOPERIDOL DECANOATE 100 MG/ML
0 INJECTION INTRAMUSCULAR
Qty: 0 | Refills: 0 | DISCHARGE

## 2019-06-04 RX ORDER — DEXTROSE 50 % IN WATER 50 %
12.5 SYRINGE (ML) INTRAVENOUS ONCE
Refills: 0 | Status: DISCONTINUED | OUTPATIENT
Start: 2019-06-04 | End: 2019-06-05

## 2019-06-04 RX ORDER — GLUCAGON INJECTION, SOLUTION 0.5 MG/.1ML
1 INJECTION, SOLUTION SUBCUTANEOUS ONCE
Refills: 0 | Status: DISCONTINUED | OUTPATIENT
Start: 2019-06-04 | End: 2019-06-05

## 2019-06-04 RX ORDER — FLUOXETINE HCL 10 MG
0 CAPSULE ORAL
Qty: 0 | Refills: 0 | DISCHARGE

## 2019-06-04 RX ORDER — INSULIN LISPRO 100/ML
VIAL (ML) SUBCUTANEOUS
Refills: 0 | Status: DISCONTINUED | OUTPATIENT
Start: 2019-06-04 | End: 2019-06-05

## 2019-06-04 RX ORDER — PANTOPRAZOLE SODIUM 20 MG/1
40 TABLET, DELAYED RELEASE ORAL
Refills: 0 | Status: DISCONTINUED | OUTPATIENT
Start: 2019-06-04 | End: 2019-06-05

## 2019-06-04 RX ORDER — AMLODIPINE BESYLATE 2.5 MG/1
2.5 TABLET ORAL DAILY
Refills: 0 | Status: DISCONTINUED | OUTPATIENT
Start: 2019-06-05 | End: 2019-06-05

## 2019-06-04 RX ORDER — SODIUM CHLORIDE 9 MG/ML
1000 INJECTION, SOLUTION INTRAVENOUS
Refills: 0 | Status: DISCONTINUED | OUTPATIENT
Start: 2019-06-04 | End: 2019-06-05

## 2019-06-04 RX ORDER — QUETIAPINE FUMARATE 200 MG/1
200 TABLET, FILM COATED ORAL AT BEDTIME
Refills: 0 | Status: DISCONTINUED | OUTPATIENT
Start: 2019-06-04 | End: 2019-06-05

## 2019-06-04 RX ORDER — FLUOXETINE HCL 10 MG
20 CAPSULE ORAL AT BEDTIME
Refills: 0 | Status: DISCONTINUED | OUTPATIENT
Start: 2019-06-04 | End: 2019-06-05

## 2019-06-04 RX ORDER — HALOPERIDOL DECANOATE 100 MG/ML
5 INJECTION INTRAMUSCULAR AT BEDTIME
Refills: 0 | Status: DISCONTINUED | OUTPATIENT
Start: 2019-06-04 | End: 2019-06-04

## 2019-06-04 RX ORDER — DEXTROSE 50 % IN WATER 50 %
25 SYRINGE (ML) INTRAVENOUS ONCE
Refills: 0 | Status: DISCONTINUED | OUTPATIENT
Start: 2019-06-04 | End: 2019-06-05

## 2019-06-04 RX ORDER — OXYCODONE HYDROCHLORIDE 5 MG/1
1 TABLET ORAL
Qty: 20 | Refills: 0
Start: 2019-06-04 | End: 2019-06-08

## 2019-06-04 RX ORDER — FENTANYL CITRATE 50 UG/ML
50 INJECTION INTRAVENOUS
Refills: 0 | Status: DISCONTINUED | OUTPATIENT
Start: 2019-06-04 | End: 2019-06-05

## 2019-06-04 RX ORDER — LISINOPRIL 2.5 MG/1
1 TABLET ORAL
Qty: 0 | Refills: 0 | DISCHARGE

## 2019-06-04 RX ORDER — OXYCODONE AND ACETAMINOPHEN 5; 325 MG/1; MG/1
1 TABLET ORAL EVERY 4 HOURS
Refills: 0 | Status: DISCONTINUED | OUTPATIENT
Start: 2019-06-04 | End: 2019-06-05

## 2019-06-04 RX ORDER — SIMVASTATIN 20 MG/1
20 TABLET, FILM COATED ORAL AT BEDTIME
Refills: 0 | Status: DISCONTINUED | OUTPATIENT
Start: 2019-06-04 | End: 2019-06-05

## 2019-06-04 RX ADMIN — FENTANYL CITRATE 50 MICROGRAM(S): 50 INJECTION INTRAVENOUS at 18:40

## 2019-06-04 RX ADMIN — QUETIAPINE FUMARATE 200 MILLIGRAM(S): 200 TABLET, FILM COATED ORAL at 23:32

## 2019-06-04 RX ADMIN — SIMVASTATIN 20 MILLIGRAM(S): 20 TABLET, FILM COATED ORAL at 23:32

## 2019-06-04 RX ADMIN — ENOXAPARIN SODIUM 40 MILLIGRAM(S): 100 INJECTION SUBCUTANEOUS at 23:32

## 2019-06-04 RX ADMIN — FENTANYL CITRATE 50 MICROGRAM(S): 50 INJECTION INTRAVENOUS at 18:55

## 2019-06-04 RX ADMIN — FENTANYL CITRATE 50 MICROGRAM(S): 50 INJECTION INTRAVENOUS at 18:54

## 2019-06-04 RX ADMIN — FENTANYL CITRATE 50 MICROGRAM(S): 50 INJECTION INTRAVENOUS at 18:29

## 2019-06-04 RX ADMIN — Medication 20 MILLIGRAM(S): at 23:32

## 2019-06-04 NOTE — DISCHARGE NOTE PROVIDER - CARE PROVIDER_API CALL
Shruthi Leon)  [facility]  [address] [place] [address]  Phone: [phone]  Fax: [phone]  Follow Up Time:

## 2019-06-04 NOTE — PATIENT PROFILE ADULT - NSPROMEDSPATCH_GEN_A_NUR
She would need to schedule an appointment to discuss this as this would be considered a high risk medication - need to discuss risks/benefits, etc.   none

## 2019-06-04 NOTE — DISCHARGE NOTE PROVIDER - HOSPITAL COURSE
Patient post-operatively had an uncomplicated hospital course. Her pain was well controlled. She is tolerating a regular diet. She is ambulating independently. She was able to void after removal of mittal. Patient with flatus. Labs and Vitals WNL upon discharge. 62yo s/p RA TLH BSO bilateral PLND omental biopsy, uterosacral ligament suspension, cystoscopy. Patient post-operatively had an uncomplicated hospital course. Her pain was well controlled. She is tolerating a regular diet. She is ambulating independently. She was able to void after removal of mittal. Patient with flatus. Labs and Vitals WNL upon discharge.

## 2019-06-04 NOTE — DISCHARGE NOTE PROVIDER - INSTRUCTIONS
Continue regular diet    May walk and climb stairs as often as you would like, no vigorous activity, do not lift anything greater than 10lbs, nothing per vagina x 6 weeks, do not drive while on pain medication.

## 2019-06-04 NOTE — DISCHARGE NOTE PROVIDER - NSDCACTIVITY_GEN_ALL_CORE
No heavy lifting/straining/Showering allowed/Walking - Indoors allowed/Walking - Outdoors allowed/Do not drive or operate machinery/Stairs allowed

## 2019-06-04 NOTE — PROGRESS NOTE ADULT - SUBJECTIVE AND OBJECTIVE BOX
patient was evaluated, patient denies sever pain, pain is well controlled, denies any nausea or vomiting, no cp leg pain or sob , vitals are stable , adequate urine output, abdomin soft , intact incisions,  required 3l of oxygen to keep pulse ox at 94% , will be transferred to the floor on continue pulse ox, and oxygen as needed .

## 2019-06-04 NOTE — CONSULT NOTE ADULT - SUBJECTIVE AND OBJECTIVE BOX
HPI:  Pt is a 64 yo female present to pst with  malignant neoplasm of the endometrium noted on 19 D&C.  She is now schedule for robotic assisted total laparoscopic hysterectomy, bilateral salpingo- oophorectomy, sentinel lymph node dissection, pelvic para aortic lymphadenotomy cystoscopy. Post Op now    Home Medications:   · 	Percocet 5/325 oral tablet: 1 tab(s) orally every 6 hours, As Needed -for severe pain MDD:4 tablets   · 	naproxen 500 mg oral tablet: 1 tab(s) orally 2 times a day   · 	pantoprazole 40 mg oral delayed release tablet: 1 tab(s) orally once a day (before a meal) MDD:1 tab  · 	amLODIPine 2.5 mg oral tablet: 1 tab(s) orally once a day MDD:1 tab  · 	albuterol HFA 2 puffs Q6 Prn  · 	benztropine 1 mg oral tablet: 3 tab(s) orally once a day  · 	busPIRone 10 mg oral tablet: 1 tab(s) orally 2 times a day  · 	Flovent  mcg/inh inhalation aerosol: 2 puff(s) inhaled 2 times a day  · 	Haldol Decanoate 100 mg/mL intramuscular solution: 125 milligram(s) intramuscular every four weeks  · 	Haldol 5 mg oral tablet: orally once a day (at bedtime)  · 	LaMICtal 100 mg oral tablet: 1 tab(s) orally 2 times a day  · 	levothyroxine 100 mcg (0.1 mg) oral capsule: 1 cap(s) orally once a day  · 	lisinopril 20 mg oral tablet: 1 tab(s) orally once a day  · 	metFORMIN 500 mg oral tablet, extended release: 1 tab(s) orally once a day  · 	Proventil HFA 90 mcg/inh inhalation aerosol:   · 	PROzac 20 mg oral capsule:   · 	SEROquel 200 mg oral tablet: orally once a day (at bedtime)  · 	simvastatin 20 mg oral tablet: 1 tab(s) orally once a day (at bedtime)          · 	Singulair 10 mg oral tablet: 1 tab(s) orally once a day    PAST MEDICAL & SURGICAL HISTORY:  Endometrial cancer  Drug abuse  Hypothyroidism  Hyperlipidemia  Sleep apnea  Asthma  Diabetes  Hypertension  Schizoaffective disorder, bipolar type  S/P dilation and curettage  H/O cardiac catheterization: radially accessed; pt reports she has one stent  S/P     ALBUTerol    90 MICROgram(s) HFA Inhaler 1 Puff(s) Inhalation daily  busPIRone 10 milliGRAM(s) Oral every 12 hours  cefoTEtan  IVPB 2 Gram(s) IV Intermittent Once  dextrose 40% Gel 15 Gram(s) Oral once PRN  dextrose 5%. 1000 milliLiter(s) IV Continuous <Continuous>  dextrose 50% Injectable 12.5 Gram(s) IV Push once  dextrose 50% Injectable 25 Gram(s) IV Push once  dextrose 50% Injectable 25 Gram(s) IV Push once  enoxaparin Injectable 40 milliGRAM(s) SubCutaneous once  fentaNYL    Injectable 50 MICROGram(s) IV Push every 10 minutes PRN  FLUoxetine 20 milliGRAM(s) Oral at bedtime  glucagon  Injectable 1 milliGRAM(s) IntraMuscular once PRN  insulin lispro (HumaLOG) corrective regimen sliding scale   SubCutaneous three times a day before meals  lactated ringers. 1000 milliLiter(s) IV Continuous <Continuous>  lamoTRIgine 100 milliGRAM(s) Oral two times a day  levothyroxine 100 MICROGram(s) Oral daily  lisinopril 20 milliGRAM(s) Oral daily  montelukast 10 milliGRAM(s) Oral daily  naproxen 500 milliGRAM(s) Oral every 12 hours  ondansetron Injectable 4 milliGRAM(s) IV Push every 6 hours PRN  oxyCODONE    5 mG/acetaminophen 325 mG 2 Tablet(s) Oral every 4 hours PRN  oxyCODONE    5 mG/acetaminophen 325 mG 1 Tablet(s) Oral every 4 hours PRN  pantoprazole    Tablet 40 milliGRAM(s) Oral before breakfast  QUEtiapine 200 milliGRAM(s) Oral at bedtime  simvastatin 20 milliGRAM(s) Oral at bedtime    MEDICATIONS  (STANDING):  ALBUTerol    90 MICROgram(s) HFA Inhaler 1 Puff(s) Inhalation daily  busPIRone 10 milliGRAM(s) Oral every 12 hours  cefoTEtan  IVPB 2 Gram(s) IV Intermittent Once  dextrose 5%. 1000 milliLiter(s) (50 mL/Hr) IV Continuous <Continuous>  dextrose 50% Injectable 12.5 Gram(s) IV Push once  dextrose 50% Injectable 25 Gram(s) IV Push once  dextrose 50% Injectable 25 Gram(s) IV Push once  enoxaparin Injectable 40 milliGRAM(s) SubCutaneous once  FLUoxetine 20 milliGRAM(s) Oral at bedtime  insulin lispro (HumaLOG) corrective regimen sliding scale   SubCutaneous three times a day before meals  lactated ringers. 1000 milliLiter(s) (125 mL/Hr) IV Continuous <Continuous>  lamoTRIgine 100 milliGRAM(s) Oral two times a day  levothyroxine 100 MICROGram(s) Oral daily  lisinopril 20 milliGRAM(s) Oral daily  montelukast 10 milliGRAM(s) Oral daily  naproxen 500 milliGRAM(s) Oral every 12 hours  pantoprazole    Tablet 40 milliGRAM(s) Oral before breakfast  QUEtiapine 200 milliGRAM(s) Oral at bedtime  simvastatin 20 milliGRAM(s) Oral at bedtime    MEDICATIONS  (PRN):  dextrose 40% Gel 15 Gram(s) Oral once PRN Blood Glucose LESS THAN 70 milliGRAM(s)/deciliter  fentaNYL    Injectable 50 MICROGram(s) IV Push every 10 minutes PRN Moderate Pain (4 - 6)  glucagon  Injectable 1 milliGRAM(s) IntraMuscular once PRN Glucose LESS THAN 70 milligrams/deciliter  ondansetron Injectable 4 milliGRAM(s) IV Push every 6 hours PRN Postoperative Nausea and/or Vomiting  oxyCODONE    5 mG/acetaminophen 325 mG 2 Tablet(s) Oral every 4 hours PRN Severe Pain (7 - 10)  oxyCODONE    5 mG/acetaminophen 325 mG 1 Tablet(s) Oral every 4 hours PRN Moderate Pain (4 - 6)      Allergies    No Known Allergies    Intolerances      SOCIAL HISTORY:  NO +Smoking No D/IVDU   FAMILY HISTORY:  FHx: heart disease +HTN        ROS  - Headache  - Neck Stiffness  - Chest Pain  - SOB  mild  Abd pain  - Pelvic Pain  - Leg Pain      Vital Signs Last 24 Hrs  T(C): 36.7 (2019 20:40), Max: 37.2 (2019 19:45)  T(F): 98.1 (2019 20:40), Max: 98.9 (2019 19:45)  HR: 91 (2019 20:40) (89 - 107)  BP: 136/85 (2019 20:40) (98/75 - 145/84)  BP(mean): --  RR: 20 (2019 20:40) (13 - 23)  SpO2: 90% (2019 20:40) (90% - 100%)    HEENT: PEARLA  Neck: Supple  Cardio: S1 S2 No Murmur  Pulm: CTA No Rales or Ronchi  Abd: Soft NT ND BS+ Incisions clean  Rectal Pelvic Breast- refused  Ext: No DCT  Skin: No Rash  Neuro: Awake Pleasant    Endometrial cancer- post op hysterectomy Pain control ambulation  Hypothyroidism - synthroid   Hyperlipidemia  Sleep apnea - night monitor  Asthma - cont nebs  Diabetes - ss for now  Hypertension  - meds with parameters   Schizoaffective disorder, bipolar type - cont meds  H/O cardiac catheterization: radially accessed; pt reports she has one stent - may need restart ASA once acceptable

## 2019-06-04 NOTE — DISCHARGE NOTE PROVIDER - NSDCFUADDAPPT_GEN_ALL_CORE_FT
Please follow-up with PA in one week for post-op visit/removal of sutures.  Follow-up with Dr. Leon in two weeks to review pathology report.   Please contact your provider for any pain uncontrolled by medication, excessive bleeding or Fever>100.4  Please take Naprosyn 1 tablet every 12 hours x 3 days, may take percocet as prescribed for breakthrough pain. Please follow-up with PA in one week for post-op visit/removal of sutures.  Follow-up with Dr. Leon in two weeks to review pathology report.   Please contact your provider for any pain uncontrolled by medication, excessive bleeding or Fever>100.4  Please take Naprosyn 1 tablet every 12 hours x 3 days, may take percocet as prescribed for breakthrough pain.    Please have continue follow-up with pulmonology for management of Sleep apnea due to noncompliance with CPAP machine. Please follow-up with PA in one week for post-op visit/removal of sutures.  Follow-up with Dr. Leon in two weeks to review pathology report.   Please contact your provider for any pain uncontrolled by medication, excessive bleeding or Fever>100.4  Please take Naprosyn 1 tablet every 12 hours x 3 days, may take percocet as prescribed for breakthrough pain.    Please follow-up with pulmonology this week for management of Sleep apnea due to noncompliance with CPAP machine.

## 2019-06-04 NOTE — PRE-OP CHECKLIST - SIDE RAILS UP
Detail Level: Zone Samples Given: Clindacin ETZ WIPES Continue Regimen: Acanya gel as spot treatment Modify Regimen: Increase Spironolactone 100 mg BID Plan: Patient will hold off on Oral Clindamycin and try new regimen. Samples Given: Retin A Micro at night. Patient will try samples first done

## 2019-06-05 ENCOUNTER — TRANSCRIPTION ENCOUNTER (OUTPATIENT)
Age: 64
End: 2019-06-05

## 2019-06-05 VITALS
SYSTOLIC BLOOD PRESSURE: 103 MMHG | HEART RATE: 90 BPM | DIASTOLIC BLOOD PRESSURE: 52 MMHG | OXYGEN SATURATION: 96 % | RESPIRATION RATE: 19 BRPM

## 2019-06-05 DIAGNOSIS — C54.1 MALIGNANT NEOPLASM OF ENDOMETRIUM: ICD-10-CM

## 2019-06-05 LAB
ANION GAP SERPL CALC-SCNC: 10 MMOL/L — SIGNIFICANT CHANGE UP (ref 5–17)
BUN SERPL-MCNC: 4 MG/DL — LOW (ref 8–20)
CALCIUM SERPL-MCNC: 8.8 MG/DL — SIGNIFICANT CHANGE UP (ref 8.6–10.2)
CHLORIDE SERPL-SCNC: 98 MMOL/L — SIGNIFICANT CHANGE UP (ref 98–107)
CO2 SERPL-SCNC: 26 MMOL/L — SIGNIFICANT CHANGE UP (ref 22–29)
CREAT SERPL-MCNC: 0.43 MG/DL — LOW (ref 0.5–1.3)
EOSINOPHIL # BLD AUTO: 0 K/UL — SIGNIFICANT CHANGE UP (ref 0–0.5)
EOSINOPHIL NFR BLD AUTO: 0 % — SIGNIFICANT CHANGE UP (ref 0–6)
GLUCOSE BLDC GLUCOMTR-MCNC: 118 MG/DL — HIGH (ref 70–99)
GLUCOSE BLDC GLUCOMTR-MCNC: 119 MG/DL — HIGH (ref 70–99)
GLUCOSE SERPL-MCNC: 142 MG/DL — HIGH (ref 70–115)
HCT VFR BLD CALC: 33.8 % — LOW (ref 37–47)
HGB BLD-MCNC: 11.2 G/DL — LOW (ref 12–16)
LYMPHOCYTES # BLD AUTO: 0.6 K/UL — LOW (ref 1–4.8)
LYMPHOCYTES # BLD AUTO: 8.3 % — LOW (ref 20–55)
MAGNESIUM SERPL-MCNC: 2.1 MG/DL — SIGNIFICANT CHANGE UP (ref 1.8–2.6)
MCHC RBC-ENTMCNC: 29.2 PG — SIGNIFICANT CHANGE UP (ref 27–31)
MCHC RBC-ENTMCNC: 33.1 G/DL — SIGNIFICANT CHANGE UP (ref 32–36)
MCV RBC AUTO: 88.3 FL — SIGNIFICANT CHANGE UP (ref 81–99)
MONOCYTES # BLD AUTO: 0.4 K/UL — SIGNIFICANT CHANGE UP (ref 0–0.8)
MONOCYTES NFR BLD AUTO: 5.5 % — SIGNIFICANT CHANGE UP (ref 3–10)
NEUTROPHILS # BLD AUTO: 6.6 K/UL — SIGNIFICANT CHANGE UP (ref 1.8–8)
NEUTROPHILS NFR BLD AUTO: 85.9 % — HIGH (ref 37–73)
PLATELET # BLD AUTO: 250 K/UL — SIGNIFICANT CHANGE UP (ref 150–400)
POTASSIUM SERPL-MCNC: 4.4 MMOL/L — SIGNIFICANT CHANGE UP (ref 3.5–5.3)
POTASSIUM SERPL-SCNC: 4.4 MMOL/L — SIGNIFICANT CHANGE UP (ref 3.5–5.3)
RBC # BLD: 3.83 M/UL — LOW (ref 4.4–5.2)
RBC # FLD: 14.2 % — SIGNIFICANT CHANGE UP (ref 11–15.6)
SODIUM SERPL-SCNC: 134 MMOL/L — LOW (ref 135–145)
WBC # BLD: 7.6 K/UL — SIGNIFICANT CHANGE UP (ref 4.8–10.8)
WBC # FLD AUTO: 7.6 K/UL — SIGNIFICANT CHANGE UP (ref 4.8–10.8)

## 2019-06-05 RX ADMIN — Medication 10 MILLIGRAM(S): at 05:43

## 2019-06-05 RX ADMIN — Medication 100 MICROGRAM(S): at 05:43

## 2019-06-05 RX ADMIN — Medication 500 MILLIGRAM(S): at 05:43

## 2019-06-05 RX ADMIN — Medication 500 MILLIGRAM(S): at 06:45

## 2019-06-05 RX ADMIN — MONTELUKAST 10 MILLIGRAM(S): 4 TABLET, CHEWABLE ORAL at 12:00

## 2019-06-05 RX ADMIN — ALBUTEROL 1 PUFF(S): 90 AEROSOL, METERED ORAL at 08:39

## 2019-06-05 RX ADMIN — OXYCODONE AND ACETAMINOPHEN 2 TABLET(S): 5; 325 TABLET ORAL at 08:46

## 2019-06-05 RX ADMIN — PANTOPRAZOLE SODIUM 40 MILLIGRAM(S): 20 TABLET, DELAYED RELEASE ORAL at 06:06

## 2019-06-05 RX ADMIN — LAMOTRIGINE 100 MILLIGRAM(S): 25 TABLET, ORALLY DISINTEGRATING ORAL at 05:43

## 2019-06-05 RX ADMIN — OXYCODONE AND ACETAMINOPHEN 2 TABLET(S): 5; 325 TABLET ORAL at 09:15

## 2019-06-05 NOTE — DISCHARGE NOTE NURSING/CASE MANAGEMENT/SOCIAL WORK - NSDCDPATPORTLINK_GEN_ALL_CORE
You can access the [facility] Patient Portal, offered by [facility], by registering with the following website: [email or web address]

## 2019-06-05 NOTE — DISCHARGE NOTE NURSING/CASE MANAGEMENT/SOCIAL WORK - NSDCFUADDAPPT_GEN_ALL_CORE_FT
Please follow-up with PA in one week for post-op visit/removal of sutures.  Follow-up with Dr. Leon in two weeks to review pathology report.   Please contact your provider for any pain uncontrolled by medication, excessive bleeding or Fever>100.4  Please take Naprosyn 1 tablet every 12 hours x 3 days, may take percocet as prescribed for breakthrough pain.    Please follow-up with pulmonology this week for management of Sleep apnea due to noncompliance with CPAP machine.

## 2019-06-05 NOTE — PROGRESS NOTE ADULT - PROBLEM SELECTOR PLAN 1
Dc to home  Instructions given  Scrips for naproxen and percocet provided for pain control  Script for Lovenox provided for continued DVT prophylaxis in light of cancer  All questions answered  Will see in office in 2 weeks for post-op visit/review of pathology report.    All of the above discussed with Dr. Leon Will repeat o2 sat on room air this morning prior to discharge.  Dc to home if 02 sat WNL on RA while awake and following void >100cc's.  Patient will see pulm outpatient this week for continue care of sleep apnea as patient noncompliant with home CPAP machine.   Instructions given  Scrips for naproxen and percocet provided for pain control  Script for Lovenox provided for continued DVT prophylaxis in light of cancer  All questions answered  Will see in office in 2 weeks for post-op visit/review of pathology report. Will repeat o2 sat on room air this morning prior to discharge.  Dc to home if 02 sat WNL on RA while awake and following void >100cc's.  Patient will see pulm outpatient this week for continue care of sleep apnea as patient noncompliant with home CPAP machine.   Instructions given  Scrips for naproxen and percocet provided for pain control  Script for Lovenox provided for continued DVT prophylaxis in light of cancer  All questions answered  Will see in office in 2 weeks for post-op visit/review of pathology report.    All of the above discussed with Dr. Silva

## 2019-06-05 NOTE — PROGRESS NOTE ADULT - ASSESSMENT
64yo s/p RA TLH BSO bilateral PLND, omental biopsies, uterosacral ligament suspension and cystoscopy.

## 2019-06-05 NOTE — CHART NOTE - NSCHARTNOTEFT_GEN_A_CORE
Patient percocet was sent to pharmacy that is now closed. Will send 2 percocet tabs (12 hours worth) to get her through the night, then encouraged patient to go to pharmacy and  remaining tabs in AM when it opens.

## 2019-06-05 NOTE — PROGRESS NOTE ADULT - SUBJECTIVE AND OBJECTIVE BOX
GYNECOLOGIC ONCOLOGY PROGRESS NOTE    POD#1    PROBLEMS:  Endometrial Cancer, HLD, HTN, Sleep apnea, asthma, hypothyroid, diabetes, schizophrenia.     Pt seen and examined at bedside with Dr. Silva.     SUBJECTIVE:    Patient is without complaints.  Pain well-controlled.  Denies Nausea, Vomiting or Diarrhea.  Denies shortness of breath, chest pain or dyspnea on exertion.  Tolerating diet.    OBJECTIVE:     VITALS:  T(F): 98 (06-05-19 @ 05:34), Max: 98.9 (06-04-19 @ 19:45)  HR: 85 (06-05-19 @ 08:43) (85 - 107)  BP: 94/63 (06-05-19 @ 05:34) (94/63 - 145/84)  RR: 19 (06-05-19 @ 05:34) (13 - 23)  SpO2: 98% (06-05-19 @ 08:43) (90% - 100%)      I&O's Summary  Montanez with adequate output overnight, removed this AM for TOV.     MEDICATIONS  (STANDING):  ALBUTerol    90 MICROgram(s) HFA Inhaler 1 Puff(s) Inhalation daily  amLODIPine   Tablet 2.5 milliGRAM(s) Oral daily  busPIRone 10 milliGRAM(s) Oral every 12 hours  cefoTEtan  IVPB 2 Gram(s) IV Intermittent Once  dextrose 5%. 1000 milliLiter(s) (50 mL/Hr) IV Continuous <Continuous>  dextrose 50% Injectable 12.5 Gram(s) IV Push once  dextrose 50% Injectable 25 Gram(s) IV Push once  dextrose 50% Injectable 25 Gram(s) IV Push once  FLUoxetine 20 milliGRAM(s) Oral at bedtime  insulin lispro (HumaLOG) corrective regimen sliding scale   SubCutaneous three times a day before meals  lactated ringers. 1000 milliLiter(s) (125 mL/Hr) IV Continuous <Continuous>  lamoTRIgine 100 milliGRAM(s) Oral two times a day  levothyroxine 100 MICROGram(s) Oral daily  lisinopril 20 milliGRAM(s) Oral daily  montelukast 10 milliGRAM(s) Oral daily  naproxen 500 milliGRAM(s) Oral every 12 hours  pantoprazole    Tablet 40 milliGRAM(s) Oral before breakfast  QUEtiapine 200 milliGRAM(s) Oral at bedtime  simvastatin 20 milliGRAM(s) Oral at bedtime    MEDICATIONS  (PRN):  dextrose 40% Gel 15 Gram(s) Oral once PRN Blood Glucose LESS THAN 70 milliGRAM(s)/deciliter  fentaNYL    Injectable 50 MICROGram(s) IV Push every 10 minutes PRN Moderate Pain (4 - 6)  glucagon  Injectable 1 milliGRAM(s) IntraMuscular once PRN Glucose LESS THAN 70 milligrams/deciliter  ondansetron Injectable 4 milliGRAM(s) IV Push every 6 hours PRN Postoperative Nausea and/or Vomiting  oxyCODONE    5 mG/acetaminophen 325 mG 2 Tablet(s) Oral every 4 hours PRN Severe Pain (7 - 10)  oxyCODONE    5 mG/acetaminophen 325 mG 1 Tablet(s) Oral every 4 hours PRN Moderate Pain (4 - 6)      Physical Exam:  Constitutional: NAD  Pulmonary: clear to auscultation bilaterally   Cardiovascular: Regular rate and rhythm   Abdomen: soft, non-tender, non-distended, normal bowel sounds  Extremities: no lower extremity edema or calve tenderness.  Incision: Clean, dry, intact.  Without signs of infection or hernia.      LABS:                        11.2   7.6   )-----------( 250      ( 05 Jun 2019 07:18 )             33.8     06-05    134<L>  |  98  |  4.0<L>  ----------------------------<  142<H>  4.4   |  26.0  |  0.43<L>    Ca    8.8      05 Jun 2019 07:18  Mg     2.1     06-05 GYNECOLOGIC ONCOLOGY PROGRESS NOTE    POD#1    PROBLEMS:  Endometrial Cancer, HLD, HTN, Sleep apnea, asthma, hypothyroid, diabetes, schizophrenia.     Pt seen and examined at bedside with Dr. Silva.     SUBJECTIVE:    Patient is without complaints.  Pain well-controlled.  Denies Nausea, Vomiting or Diarrhea.  Denies shortness of breath, chest pain or dyspnea on exertion.  Tolerating diet.    OBJECTIVE:     VITALS:  T(F): 98 (06-05-19 @ 05:34), Max: 98.9 (06-04-19 @ 19:45)  HR: 85 (06-05-19 @ 08:43) (85 - 107)  BP: 94/63 (06-05-19 @ 05:34) (94/63 - 145/84), RN called this morning stating patient desaturated while asleep, now 95 with 2L NC.   RR: 19 (06-05-19 @ 05:34) (13 - 23)  SpO2: 98% (06-05-19 @ 08:43) (90% - 100%)      I&O's Summary  Montanez with adequate output overnight, removed this AM for TOV.     MEDICATIONS  (STANDING):  ALBUTerol    90 MICROgram(s) HFA Inhaler 1 Puff(s) Inhalation daily  amLODIPine   Tablet 2.5 milliGRAM(s) Oral daily  busPIRone 10 milliGRAM(s) Oral every 12 hours  cefoTEtan  IVPB 2 Gram(s) IV Intermittent Once  dextrose 5%. 1000 milliLiter(s) (50 mL/Hr) IV Continuous <Continuous>  dextrose 50% Injectable 12.5 Gram(s) IV Push once  dextrose 50% Injectable 25 Gram(s) IV Push once  dextrose 50% Injectable 25 Gram(s) IV Push once  FLUoxetine 20 milliGRAM(s) Oral at bedtime  insulin lispro (HumaLOG) corrective regimen sliding scale   SubCutaneous three times a day before meals  lactated ringers. 1000 milliLiter(s) (125 mL/Hr) IV Continuous <Continuous>  lamoTRIgine 100 milliGRAM(s) Oral two times a day  levothyroxine 100 MICROGram(s) Oral daily  lisinopril 20 milliGRAM(s) Oral daily  montelukast 10 milliGRAM(s) Oral daily  naproxen 500 milliGRAM(s) Oral every 12 hours  pantoprazole    Tablet 40 milliGRAM(s) Oral before breakfast  QUEtiapine 200 milliGRAM(s) Oral at bedtime  simvastatin 20 milliGRAM(s) Oral at bedtime    MEDICATIONS  (PRN):  dextrose 40% Gel 15 Gram(s) Oral once PRN Blood Glucose LESS THAN 70 milliGRAM(s)/deciliter  fentaNYL    Injectable 50 MICROGram(s) IV Push every 10 minutes PRN Moderate Pain (4 - 6)  glucagon  Injectable 1 milliGRAM(s) IntraMuscular once PRN Glucose LESS THAN 70 milligrams/deciliter  ondansetron Injectable 4 milliGRAM(s) IV Push every 6 hours PRN Postoperative Nausea and/or Vomiting  oxyCODONE    5 mG/acetaminophen 325 mG 2 Tablet(s) Oral every 4 hours PRN Severe Pain (7 - 10)  oxyCODONE    5 mG/acetaminophen 325 mG 1 Tablet(s) Oral every 4 hours PRN Moderate Pain (4 - 6)      Physical Exam:  Constitutional: NAD  Pulmonary: clear to auscultation bilaterally   Cardiovascular: Regular rate and rhythm   Abdomen: soft, non-tender, non-distended, normal bowel sounds  Extremities: no lower extremity edema or calve tenderness.  Incision: Clean, dry, intact.  Without signs of infection or hernia.      LABS:                        11.2   7.6   )-----------( 250      ( 05 Jun 2019 07:18 )             33.8     06-05    134<L>  |  98  |  4.0<L>  ----------------------------<  142<H>  4.4   |  26.0  |  0.43<L>    Ca    8.8      05 Jun 2019 07:18  Mg     2.1     06-05

## 2019-06-07 LAB — NON-GYNECOLOGICAL CYTOLOGY STUDY: SIGNIFICANT CHANGE UP

## 2019-06-13 LAB — PATH REPORT ADDENDUM.SYNOPTIC DOC: SIGNIFICANT CHANGE UP

## 2019-06-17 LAB — SURGICAL PATHOLOGY STUDY: SIGNIFICANT CHANGE UP

## 2019-07-01 PROBLEM — C54.1 MALIGNANT NEOPLASM OF ENDOMETRIUM: Chronic | Status: ACTIVE | Noted: 2019-05-30

## 2019-07-10 PROCEDURE — 88305 TISSUE EXAM BY PATHOLOGIST: CPT

## 2019-07-10 PROCEDURE — 82962 GLUCOSE BLOOD TEST: CPT

## 2019-07-10 PROCEDURE — 85027 COMPLETE CBC AUTOMATED: CPT

## 2019-07-10 PROCEDURE — 88307 TISSUE EXAM BY PATHOLOGIST: CPT

## 2019-07-10 PROCEDURE — 88112 CYTOPATH CELL ENHANCE TECH: CPT

## 2019-07-10 PROCEDURE — S2900: CPT

## 2019-07-10 PROCEDURE — 94640 AIRWAY INHALATION TREATMENT: CPT

## 2019-07-10 PROCEDURE — 88341 IMHCHEM/IMCYTCHM EA ADD ANTB: CPT

## 2019-07-10 PROCEDURE — 88309 TISSUE EXAM BY PATHOLOGIST: CPT

## 2019-07-10 PROCEDURE — 83735 ASSAY OF MAGNESIUM: CPT

## 2019-07-10 PROCEDURE — 80048 BASIC METABOLIC PNL TOTAL CA: CPT

## 2019-07-10 PROCEDURE — 88342 IMHCHEM/IMCYTCHM 1ST ANTB: CPT

## 2019-07-31 ENCOUNTER — APPOINTMENT (OUTPATIENT)
Dept: GYNECOLOGIC ONCOLOGY | Facility: CLINIC | Age: 64
End: 2019-07-31
Payer: MEDICAID

## 2019-07-31 DIAGNOSIS — E66.01 MORBID (SEVERE) OBESITY DUE TO EXCESS CALORIES: ICD-10-CM

## 2019-07-31 DIAGNOSIS — Z86.39 PERSONAL HISTORY OF OTHER ENDOCRINE, NUTRITIONAL AND METABOLIC DISEASE: ICD-10-CM

## 2019-07-31 DIAGNOSIS — R87.619 UNSPECIFIED ABNORMAL CYTOLOGICAL FINDINGS IN SPECIMENS FROM CERVIX UTERI: ICD-10-CM

## 2019-07-31 DIAGNOSIS — Z87.42 PERSONAL HISTORY OF OTHER DISEASES OF THE FEMALE GENITAL TRACT: ICD-10-CM

## 2019-07-31 PROCEDURE — 99024 POSTOP FOLLOW-UP VISIT: CPT

## 2019-07-31 NOTE — DISCUSSION/SUMMARY
[Clean] : was clean [Dry] : was dry [Erythema] : was not erythematous [Intact] : was intact [Seroma] : had no seroma [Ecchymosis] : was not ecchymotic [None] : had no drainage [Normal Skin] : normal appearance [Tender] : nontender [Firm] : soft [Abnormal Bowel Sounds] : normal bowel sounds [Rebound] : no rebound tenderness [Incisional Hernia] : no incisional hernia [Guarding] : no guarding [Mass] : no palpable mass [External Genitalia Abnormal] : normal external genitalia [Doing Well] : is doing well [Vaginal Exam Abnormal] : normal vaginal exam [Excellent Pain Control] : has excellent pain control [No Sign of Infection] : is showing no signs of infection [de-identified] : cuff well healed

## 2019-07-31 NOTE — REASON FOR VISIT
[Post Op] : post op visit [de-identified] : 6/4/2019 [de-identified] : TRH, BSO, staging [de-identified] : PT presents for post-op check. She was living in [place] with her daughter post-operatively and has now returned to . She reports recovering well and has no pain or vaginal bleeding now.

## 2019-07-31 NOTE — END OF VISIT
[FreeTextEntry3] : RTC in 3 months for surveillance\par Medical oncology and radiation oncology consultation

## 2019-07-31 NOTE — ASSESSMENT
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 She was diagnosed based on D&C. \par \par Discussed that standard of care for a serous cancer is chemotherapy and vaginal brachytherapy. I will send for consultation with MEdical oncology and radiation oncology, but in the setting of no residual disease there was likely no myometrial invasion and one could make an argument for not treating, although there is no a lot of literature to support it. The patient has limited capacity to understand this difficult subject so a discussion with the daughter will be needed. She lives out of state in [place].

## 2019-08-01 ENCOUNTER — FORM ENCOUNTER (OUTPATIENT)
Age: 64
End: 2019-08-01

## 2019-08-02 ENCOUNTER — APPOINTMENT (OUTPATIENT)
Dept: CT IMAGING | Facility: CLINIC | Age: 64
End: 2019-08-02
Payer: MEDICAID

## 2019-08-02 ENCOUNTER — OUTPATIENT (OUTPATIENT)
Dept: OUTPATIENT SERVICES | Facility: HOSPITAL | Age: 64
LOS: 1 days | End: 2019-08-02
Payer: MEDICAID

## 2019-08-02 DIAGNOSIS — C54.1 MALIGNANT NEOPLASM OF ENDOMETRIUM: ICD-10-CM

## 2019-08-02 DIAGNOSIS — Z98.89 OTHER SPECIFIED POSTPROCEDURAL STATES: Chronic | ICD-10-CM

## 2019-08-02 DIAGNOSIS — Z98.890 OTHER SPECIFIED POSTPROCEDURAL STATES: Chronic | ICD-10-CM

## 2019-08-02 PROCEDURE — 82565 ASSAY OF CREATININE: CPT

## 2019-08-02 PROCEDURE — 74177 CT ABD & PELVIS W/CONTRAST: CPT

## 2019-08-02 PROCEDURE — 74177 CT ABD & PELVIS W/CONTRAST: CPT | Mod: 26

## 2019-09-26 ENCOUNTER — APPOINTMENT (OUTPATIENT)
Dept: NEUROLOGY | Facility: CLINIC | Age: 64
End: 2019-09-26
Payer: MEDICAID

## 2019-09-26 VITALS
WEIGHT: 248 LBS | HEIGHT: 62 IN | DIASTOLIC BLOOD PRESSURE: 60 MMHG | BODY MASS INDEX: 45.64 KG/M2 | SYSTOLIC BLOOD PRESSURE: 110 MMHG

## 2019-09-26 PROCEDURE — 99213 OFFICE O/P EST LOW 20 MIN: CPT

## 2019-09-26 NOTE — ASSESSMENT
[FreeTextEntry1] : This is a 63 year-old woman with a long history of chronic lower back pain.\par \par She describes having had imaging which showed disc herniations the past\par \par I have explained that I can refer to physical therapy once she has dealt with her more pressing issues per\par \par I will see her back in 4 months.

## 2019-09-26 NOTE — PHYSICAL EXAM
[General Appearance - Alert] : alert [General Appearance - In No Acute Distress] : in no acute distress [Oriented To Time, Place, And Person] : oriented to person, place, and time [Affect] : the affect was normal [Memory Remote] : remote memory was not impaired [Memory Recent] : recent memory was not impaired [Cranial Nerves Optic (II)] : visual acuity intact bilaterally,  visual fields full to confrontation, pupils equal round and reactive to light [Cranial Nerves Oculomotor (III)] : extraocular motion intact [Cranial Nerves Trigeminal (V)] : facial sensation intact symmetrically [Cranial Nerves Facial (VII)] : face symmetrical [Cranial Nerves Vestibulocochlear (VIII)] : hearing was intact bilaterally [Cranial Nerves Glossopharyngeal (IX)] : tongue and palate midline [Cranial Nerves Accessory (XI - Cranial And Spinal)] : head turning and shoulder shrug symmetric [Cranial Nerves Hypoglossal (XII)] : there was no tongue deviation with protrusion [Motor Tone] : muscle tone was normal in all four extremities [Motor Strength] : muscle strength was normal in all four extremities [Sensation Tactile Decrease] : light touch was intact [Sensation Pain / Temperature Decrease] : pain and temperature was intact [Limited Balance] : the patient's balance was impaired [1+] : Patella left 1+ [Optic Disc Abnormality] : the optic disc were normal in size and color [Edema] : there was no peripheral edema [Involuntary Movements] : no involuntary movements were seen [FreeTextEntry1] : The patient is obese. [Dysarthria] : no dysarthria [Aphasia] : no dysphasia/aphasia [Coordination - Dysmetria Impaired Finger-to-Nose Bilateral] : not present [Plantar Reflex Right Only] : normal on the right [Plantar Reflex Left Only] : normal on the left [FreeTextEntry8] : Gait is antalgic and required a walker.

## 2019-09-26 NOTE — HISTORY OF PRESENT ILLNESS
[FreeTextEntry1] : I saw this patient in the office today.\par \par As you recall, she described a history of lower back pain.\par This has been with her for many years.\par She reports that she did physical therapy about 10 years ago with improvement.\par The pains gradually increased again.\par They are now daily.\par They wax and wane in intensity.\par She denies associated radiation down the legs.\par \par She reported that imaging had shown herniated discs in the past.\par \par I had referred her to physical therapy.\par She had not gone because she had more pressing issues.\par She was diagnosed with cancer and is now status post hysterectomy.\par \par \par

## 2019-10-03 ENCOUNTER — APPOINTMENT (OUTPATIENT)
Dept: GYNECOLOGIC ONCOLOGY | Facility: CLINIC | Age: 64
End: 2019-10-03
Payer: MEDICAID

## 2019-10-03 VITALS
SYSTOLIC BLOOD PRESSURE: 190 MMHG | HEART RATE: 108 BPM | BODY MASS INDEX: 44.35 KG/M2 | OXYGEN SATURATION: 94 % | RESPIRATION RATE: 16 BRPM | HEIGHT: 62 IN | WEIGHT: 241 LBS | DIASTOLIC BLOOD PRESSURE: 102 MMHG

## 2019-10-03 DIAGNOSIS — C54.1 MALIGNANT NEOPLASM OF ENDOMETRIUM: ICD-10-CM

## 2019-10-03 DIAGNOSIS — B96.89 ACUTE VAGINITIS: ICD-10-CM

## 2019-10-03 DIAGNOSIS — N76.0 ACUTE VAGINITIS: ICD-10-CM

## 2019-10-03 PROCEDURE — 99214 OFFICE O/P EST MOD 30 MIN: CPT

## 2019-10-03 RX ORDER — METRONIDAZOLE 500 MG/1
500 TABLET ORAL TWICE DAILY
Qty: 10 | Refills: 0 | Status: ACTIVE | COMMUNITY
Start: 2019-10-03 | End: 1900-01-01

## 2019-10-03 NOTE — HISTORY OF PRESENT ILLNESS
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 She declined adjuvant treatment with radiation/chemotherapy. She presents for surveillance. She reports no vaginal bleeding, but has notince a foul smelling discharge which bothers her when she goes to pee. She also reports chronic lower back pain, for which she is going to start physical therapy.

## 2019-10-03 NOTE — DISCUSSION/SUMMARY
[FreeTextEntry1] : Discussed with the patient that as she did not go for consultation with radiation/medical oncology it makes no sense at this point to have a discussion to get adjuvant treatment. Her risk is overall low, but risk of recurrence still exists as she did have an aggressive cancer.\par \par I will treat her bacterial vaginosis empirically. \par \par I recommended she follow up with physical therapy for lower back pain radiating to her legs.

## 2019-10-03 NOTE — PHYSICAL EXAM
[Abnormal] : General appearance: Abnormal [Absent] : Adnexa(ae): Absent [Normal] : Recto-Vaginal Exam: Normal [FreeTextEntry1] : shorter in statue, with poor dentition [Ambulatory and capable of all self care but unable to carry out any work activities] : Status 2- Ambulatory and capable of all self care but unable to carry out any work activities. Up and about more than 50% of waking hours

## 2019-10-03 NOTE — ASSESSMENT
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 No evidence of disease today. Bacterial vaginosis.

## 2019-10-31 PROCEDURE — 36415 COLL VENOUS BLD VENIPUNCTURE: CPT

## 2019-10-31 PROCEDURE — 88341 IMHCHEM/IMCYTCHM EA ADD ANTB: CPT

## 2019-10-31 PROCEDURE — 82962 GLUCOSE BLOOD TEST: CPT

## 2019-10-31 PROCEDURE — 88305 TISSUE EXAM BY PATHOLOGIST: CPT

## 2019-10-31 PROCEDURE — 88342 IMHCHEM/IMCYTCHM 1ST ANTB: CPT

## 2020-01-16 ENCOUNTER — APPOINTMENT (OUTPATIENT)
Dept: GYNECOLOGIC ONCOLOGY | Facility: CLINIC | Age: 65
End: 2020-01-16
Payer: MEDICAID

## 2020-01-16 VITALS — WEIGHT: 234 LBS | BODY MASS INDEX: 43.06 KG/M2 | HEIGHT: 62 IN

## 2020-01-16 DIAGNOSIS — R14.0 ABDOMINAL DISTENSION (GASEOUS): ICD-10-CM

## 2020-01-16 DIAGNOSIS — Z86.010 PERSONAL HISTORY OF COLONIC POLYPS: ICD-10-CM

## 2020-01-16 PROCEDURE — 99214 OFFICE O/P EST MOD 30 MIN: CPT

## 2020-01-16 NOTE — HISTORY OF PRESENT ILLNESS
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 She declined adjuvant treatment with radiation/chemotherapy. She presents for surveillance. No vaginal bleeding or discharge. She reports lower abdominal discomfort. She reports history of polyps, but has not had a recent colonoscopy. She needs a GI referral. She also reports burping and reflux.

## 2020-01-16 NOTE — ASSESSMENT
[FreeTextEntry1] : Pt is a 63 yo Stage Ia with high grade serous endometrial cancer confined to a polyp/endometrium s/p TRH, BSO, staging with no residual disease on pathology. No evidence of disease today. Abdominal bloating and occasional discomfort. Referral for GI. If negative will get CT abdomen/pelvis.

## 2020-01-23 ENCOUNTER — APPOINTMENT (OUTPATIENT)
Dept: NEUROLOGY | Facility: CLINIC | Age: 65
End: 2020-01-23

## 2020-01-27 ENCOUNTER — EMERGENCY (EMERGENCY)
Facility: HOSPITAL | Age: 65
LOS: 1 days | Discharge: HOME | End: 2020-01-27
Attending: STUDENT IN AN ORGANIZED HEALTH CARE EDUCATION/TRAINING PROGRAM
Payer: COMMERCIAL

## 2020-01-27 VITALS
OXYGEN SATURATION: 94 % | TEMPERATURE: 99 F | DIASTOLIC BLOOD PRESSURE: 72 MMHG | WEIGHT: 231.93 LBS | HEART RATE: 94 BPM | SYSTOLIC BLOOD PRESSURE: 132 MMHG | RESPIRATION RATE: 17 BRPM | HEIGHT: 62 IN

## 2020-01-27 DIAGNOSIS — Z98.890 OTHER SPECIFIED POSTPROCEDURAL STATES: Chronic | ICD-10-CM

## 2020-01-27 DIAGNOSIS — Z98.89 OTHER SPECIFIED POSTPROCEDURAL STATES: Chronic | ICD-10-CM

## 2020-01-27 PROCEDURE — 99218: CPT

## 2020-01-27 PROCEDURE — 71046 X-RAY EXAM CHEST 2 VIEWS: CPT | Mod: 26

## 2020-01-27 PROCEDURE — 72131 CT LUMBAR SPINE W/O DYE: CPT | Mod: 26

## 2020-01-27 RX ORDER — DEXTROSE 50 % IN WATER 50 %
12.5 SYRINGE (ML) INTRAVENOUS ONCE
Refills: 0 | Status: DISCONTINUED | OUTPATIENT
Start: 2020-01-27 | End: 2020-02-03

## 2020-01-27 RX ORDER — OXYCODONE AND ACETAMINOPHEN 5; 325 MG/1; MG/1
2 TABLET ORAL ONCE
Refills: 0 | Status: DISCONTINUED | OUTPATIENT
Start: 2020-01-27 | End: 2020-01-27

## 2020-01-27 RX ORDER — BENZTROPINE MESYLATE 1 MG
1 TABLET ORAL THREE TIMES A DAY
Refills: 0 | Status: DISCONTINUED | OUTPATIENT
Start: 2020-01-27 | End: 2020-02-03

## 2020-01-27 RX ORDER — HALOPERIDOL DECANOATE 100 MG/ML
5 INJECTION INTRAMUSCULAR DAILY
Refills: 0 | Status: DISCONTINUED | OUTPATIENT
Start: 2020-01-27 | End: 2020-02-03

## 2020-01-27 RX ORDER — AMLODIPINE BESYLATE 2.5 MG/1
2.5 TABLET ORAL DAILY
Refills: 0 | Status: DISCONTINUED | OUTPATIENT
Start: 2020-01-27 | End: 2020-02-03

## 2020-01-27 RX ORDER — IBUPROFEN 200 MG
600 TABLET ORAL ONCE
Refills: 0 | Status: COMPLETED | OUTPATIENT
Start: 2020-01-27 | End: 2020-01-27

## 2020-01-27 RX ORDER — DEXTROSE 50 % IN WATER 50 %
25 SYRINGE (ML) INTRAVENOUS ONCE
Refills: 0 | Status: DISCONTINUED | OUTPATIENT
Start: 2020-01-27 | End: 2020-02-03

## 2020-01-27 RX ORDER — METHOCARBAMOL 500 MG/1
750 TABLET, FILM COATED ORAL
Refills: 0 | Status: DISCONTINUED | OUTPATIENT
Start: 2020-01-27 | End: 2020-02-03

## 2020-01-27 RX ORDER — LEVOTHYROXINE SODIUM 125 MCG
88 TABLET ORAL DAILY
Refills: 0 | Status: DISCONTINUED | OUTPATIENT
Start: 2020-01-27 | End: 2020-02-03

## 2020-01-27 RX ORDER — SODIUM CHLORIDE 9 MG/ML
1000 INJECTION, SOLUTION INTRAVENOUS
Refills: 0 | Status: DISCONTINUED | OUTPATIENT
Start: 2020-01-27 | End: 2020-02-03

## 2020-01-27 RX ORDER — METHOCARBAMOL 500 MG/1
1 TABLET, FILM COATED ORAL
Qty: 14 | Refills: 0
Start: 2020-01-27 | End: 2020-02-02

## 2020-01-27 RX ORDER — INSULIN LISPRO 100/ML
VIAL (ML) SUBCUTANEOUS
Refills: 0 | Status: DISCONTINUED | OUTPATIENT
Start: 2020-01-27 | End: 2020-02-03

## 2020-01-27 RX ORDER — LISINOPRIL 2.5 MG/1
20 TABLET ORAL DAILY
Refills: 0 | Status: DISCONTINUED | OUTPATIENT
Start: 2020-01-27 | End: 2020-02-03

## 2020-01-27 RX ORDER — LAMOTRIGINE 25 MG/1
100 TABLET, ORALLY DISINTEGRATING ORAL
Refills: 0 | Status: DISCONTINUED | OUTPATIENT
Start: 2020-01-27 | End: 2020-02-03

## 2020-01-27 RX ORDER — QUETIAPINE FUMARATE 200 MG/1
200 TABLET, FILM COATED ORAL AT BEDTIME
Refills: 0 | Status: DISCONTINUED | OUTPATIENT
Start: 2020-01-27 | End: 2020-02-03

## 2020-01-27 RX ORDER — LIDOCAINE 4 G/100G
1 CREAM TOPICAL ONCE
Refills: 0 | Status: COMPLETED | OUTPATIENT
Start: 2020-01-27 | End: 2020-01-27

## 2020-01-27 RX ORDER — SIMVASTATIN 20 MG/1
20 TABLET, FILM COATED ORAL AT BEDTIME
Refills: 0 | Status: DISCONTINUED | OUTPATIENT
Start: 2020-01-27 | End: 2020-02-03

## 2020-01-27 RX ORDER — DEXTROSE 50 % IN WATER 50 %
15 SYRINGE (ML) INTRAVENOUS ONCE
Refills: 0 | Status: DISCONTINUED | OUTPATIENT
Start: 2020-01-27 | End: 2020-02-03

## 2020-01-27 RX ORDER — DIAZEPAM 5 MG
5 TABLET ORAL ONCE
Refills: 0 | Status: DISCONTINUED | OUTPATIENT
Start: 2020-01-27 | End: 2020-01-27

## 2020-01-27 RX ORDER — GLUCAGON INJECTION, SOLUTION 0.5 MG/.1ML
1 INJECTION, SOLUTION SUBCUTANEOUS ONCE
Refills: 0 | Status: DISCONTINUED | OUTPATIENT
Start: 2020-01-27 | End: 2020-02-03

## 2020-01-27 RX ORDER — LEVOTHYROXINE SODIUM 125 MCG
100 TABLET ORAL DAILY
Refills: 0 | Status: DISCONTINUED | OUTPATIENT
Start: 2020-01-27 | End: 2020-01-27

## 2020-01-27 RX ADMIN — OXYCODONE AND ACETAMINOPHEN 2 TABLET(S): 5; 325 TABLET ORAL at 23:00

## 2020-01-27 RX ADMIN — LIDOCAINE 1 PATCH: 4 CREAM TOPICAL at 22:53

## 2020-01-27 RX ADMIN — Medication 600 MILLIGRAM(S): at 22:29

## 2020-01-27 RX ADMIN — OXYCODONE AND ACETAMINOPHEN 2 TABLET(S): 5; 325 TABLET ORAL at 22:53

## 2020-01-27 RX ADMIN — Medication 5 MILLIGRAM(S): at 20:06

## 2020-01-27 RX ADMIN — Medication 600 MILLIGRAM(S): at 20:07

## 2020-01-27 NOTE — ED ADULT NURSE NOTE - OBJECTIVE STATEMENT
assumed pt care in [facility] at 2135. Pt states has had lower back pain since friday.  pain radiates down right leg.  She denies injury. Moves all four extremities without difficulty. Awaiting radiololgy results.

## 2020-01-27 NOTE — ED ADULT TRIAGE NOTE - CHIEF COMPLAINT QUOTE
Atraumatic back pain since Friday.  Saw her PMD who recommended therapy but pain got worse so she came to ED.  Ambulatory on scene with walker.

## 2020-01-27 NOTE — ED PROVIDER NOTE - NSFOLLOWUPINSTRUCTIONS_ED_ALL_ED_FT
Back Pain    Back pain is very common in adults. The cause of back pain is rarely dangerous and the pain often gets better over time. The cause of your back pain may not be known and may include strain of muscles or ligaments, degeneration of the spinal disks, or arthritis. Occasionally the pain may radiate down your leg(s). Over-the-counter medicines to reduce pain and inflammation are often the most helpful. Stretching and remaining active frequently helps the healing process.     SEEK IMMEDIATE MEDICAL CARE IF YOU HAVE ANY OF THE FOLLOWING SYMPTOMS: bowel or bladder control problems, unusual weakness or numbness in your arms or legs, nausea or vomiting, abdominal pain, fever, dizziness/lightheadedness.     Please follow up with the spine center within one week

## 2020-01-27 NOTE — ED ADULT NURSE NOTE - PMH
Asthma    Diabetes    Drug abuse  Last used when she was in her 20-30's  Endometrial cancer    Hyperlipidemia    Hypertension    Hypothyroidism    Schizoaffective disorder, bipolar type    Sleep apnea

## 2020-01-27 NOTE — ED PROVIDER NOTE - ATTENDING CONTRIBUTION TO CARE
skin normal color for race, warm, dry and intact.
I, Thang Dominguez, performed a face to face bedside interview with this patient regarding history of present illness, review of symptoms and relevant past medical, social and family history.  I completed an independent physical examination. I have communicated the patient’s plan of care and disposition with the ACP.  64 year old female with PMH schizoaffective disorder, HTN, HLD presents with low back pain. No trauma, no bowel/bladder dysfunction. Pt states she cannot walk due to the pain. DTRs, sensation intact, MS intact in bed. Pain and underlying psych disorder is likely etiology of the reported weakness and inability to ambulate rather than pure spinal cord pathology. Will place on Obs for MR, PT, pain control, spine consult  Gen: NAD, well appearing  CV: RRR  Pul: CTA b/l  Abd: Soft, non-distended, non-tender  Neuro: no focal deficits  msk: +straight leg raise, R paraspinal tenderness

## 2020-01-27 NOTE — ED PROVIDER NOTE - CLINICAL SUMMARY MEDICAL DECISION MAKING FREE TEXT BOX
Pt with non-focal neuro exam, but unable to walk due to pain. Will place on Obs for MRI and spine consult

## 2020-01-27 NOTE — ED PROVIDER NOTE - CARE PROVIDER_API CALL
Lee Alvarez)  Neuro[facility]  [facility] [address]  [place] [address]  Phone: [phone]  Fax: [phone]  Follow Up Time: 4-6 Days

## 2020-01-27 NOTE — ED ADULT NURSE NOTE - PSH
H/O cardiac catheterization  radially accessed; pt reports she has one stent  S/P     S/P dilation and curettage

## 2020-01-27 NOTE — ED PROVIDER NOTE - PATIENT PORTAL LINK FT
You can access the FollowMyHealth Patient Portal offered by [facility] by registering at the following website: [email or web address]. By joining [employer]’s FollowMyHealth portal, you will also be able to view your health information using other applications (apps) compatible with our system.

## 2020-01-27 NOTE — ED PROVIDER NOTE - OBJECTIVE STATEMENT
64 year old female with PMh HTN, HLD, DM, schizoaffective disorder presents with back pain since Friday. No trauma. Pain radiates down the back of the R leg, is worse with movement and positonal changes. She states that the pain is so severe she cannot walk, but denies numbness, fever, weight loss, bowel/bladder dysfunction

## 2020-01-28 VITALS
OXYGEN SATURATION: 93 % | DIASTOLIC BLOOD PRESSURE: 75 MMHG | TEMPERATURE: 97 F | SYSTOLIC BLOOD PRESSURE: 112 MMHG | RESPIRATION RATE: 17 BRPM | HEART RATE: 62 BPM

## 2020-01-28 LAB — GLUCOSE BLDC GLUCOMTR-MCNC: 98 MG/DL — SIGNIFICANT CHANGE UP (ref 70–99)

## 2020-01-28 PROCEDURE — 99284 EMERGENCY DEPT VISIT MOD MDM: CPT

## 2020-01-28 PROCEDURE — 72131 CT LUMBAR SPINE W/O DYE: CPT

## 2020-01-28 PROCEDURE — 71046 X-RAY EXAM CHEST 2 VIEWS: CPT

## 2020-01-28 PROCEDURE — 82962 GLUCOSE BLOOD TEST: CPT

## 2020-01-28 PROCEDURE — 99217: CPT

## 2020-01-28 PROCEDURE — G0378: CPT

## 2020-01-28 RX ORDER — METHOCARBAMOL 500 MG/1
1 TABLET, FILM COATED ORAL
Qty: 15 | Refills: 0
Start: 2020-01-28 | End: 2020-02-01

## 2020-01-28 RX ORDER — OXYCODONE AND ACETAMINOPHEN 5; 325 MG/1; MG/1
1 TABLET ORAL EVERY 6 HOURS
Refills: 0 | Status: DISCONTINUED | OUTPATIENT
Start: 2020-01-28 | End: 2020-01-28

## 2020-01-28 RX ORDER — LIDOCAINE 4 G/100G
1 CREAM TOPICAL
Qty: 10 | Refills: 0
Start: 2020-01-28 | End: 2020-02-06

## 2020-01-28 RX ORDER — IBUPROFEN 200 MG
1 TABLET ORAL
Qty: 15 | Refills: 0
Start: 2020-01-28 | End: 2020-02-01

## 2020-01-28 RX ADMIN — Medication 88 MICROGRAM(S): at 06:12

## 2020-01-28 RX ADMIN — LAMOTRIGINE 100 MILLIGRAM(S): 25 TABLET, ORALLY DISINTEGRATING ORAL at 06:12

## 2020-01-28 RX ADMIN — LISINOPRIL 20 MILLIGRAM(S): 2.5 TABLET ORAL at 06:12

## 2020-01-28 RX ADMIN — LIDOCAINE 1 PATCH: 4 CREAM TOPICAL at 09:52

## 2020-01-28 RX ADMIN — Medication 1 MILLIGRAM(S): at 06:12

## 2020-01-28 RX ADMIN — METHOCARBAMOL 750 MILLIGRAM(S): 500 TABLET, FILM COATED ORAL at 06:12

## 2020-01-28 RX ADMIN — Medication 10 MILLIGRAM(S): at 06:12

## 2020-01-28 RX ADMIN — AMLODIPINE BESYLATE 2.5 MILLIGRAM(S): 2.5 TABLET ORAL at 06:12

## 2020-01-28 NOTE — ED CDU PROVIDER DISPOSITION NOTE - ATTENDING CONTRIBUTION TO CARE
I performed a face to face history and physical exam of the patient and discussed their management with the resident/ACP. I reviewed the resident/ACP's note and agree with the documented findings and plan of care.    Pt with low back pain. no red flags. ambulating in ER. PT evaluated recommended walker and home PT. will d/c with outpatient f/up.

## 2020-01-28 NOTE — ED ADULT NURSE REASSESSMENT NOTE - NS ED NURSE REASSESS COMMENT FT1
pt attempted to stand and walk.  Could not take any steps due to pain.  Dr. Dominguez and JESSIKA steinberg aware and are at bedside to reevaluate
Pt A&O x's 3, OOB with PT. Pt will go home and follow up with outpatient MRI. VSS
Patient  received from dayshift RN. Assumed patient care from RN. Patient appears age appropriate. well nourished. normal affect, pleasant mood in  no distress. Patient resting on stretcher. Pt AxO4, VSS, respirations CTA BL, no wheeze/stridor/Rhonchi/Crackles. Not on supplemental O2. Able to speak in full sentences without distress. Cardio NSR on cardiac monitor , S1S2, Regular, rate and Rhythm. ABD- soft/non-tender w/ light palpation/ Non distended, normal BSx4 quadrants. No guarding/ rebound tenderness. Skin c/d/I. Safety measures taken, bed in low position, call bell within reach, side rails up x2. Plan of care explained. Pt verbalized understanding. Will continue to monitor.

## 2020-01-28 NOTE — PHYSICAL THERAPY INITIAL EVALUATION ADULT - PERTINENT HX OF CURRENT PROBLEM, REHAB EVAL
Pt came to ED with c/o low back pain, CT of lumbar spine: No acute displaced fracture or traumatic malalignment.

## 2020-01-28 NOTE — PROVIDER CONTACT NOTE (OTHER) - ASSESSMENT
CM MET W/ PT AND PALOMO ESCOBEDO AT BS.  PT STATES SHE WOULD PREFER TO GO TO OUT PATIENT THERAPY OVER [facility].  SCRIPT PROVIDED BY NP.  PT STABLE FOR DC FORM CM STANDPOINT.

## 2020-01-28 NOTE — ED CDU PROVIDER DISPOSITION NOTE - NSFOLLOWUPINSTRUCTIONS_ED_ALL_ED_FT
1) TAKE IBUPROFEN EVERY 8 HOURS WITH FOOD FOR 5 DAYS.  ROBAXIN CAN MAKE YOU SLEEPY, BE CAREFUL WALKING.  IF YOUR INSURANCE COMPANY DOES NOT COVER THE LIDODERM PATCH YOU CAN BUY SALON LINK OVER THE COUNTER  2) BEGIN PHYSICAL THERAPY.  3) IF YOU STILL HAVE THE PAIN AFTER 5 DAYS CALL FOR AN APPOINTMENT AT THE SPINE CLINIC

## 2020-01-28 NOTE — ED CDU PROVIDER INITIAL DAY NOTE - OBJECTIVE STATEMENT
pt is a 63 y/o female presenting to the ed with lower back pain since friday, no injuries or trauma radiating down the right leg, pmd recommended pt therapy. pt with no relief with over the counter medications, worse with movement. pt denies cp, sOB, fevers, numbness or loss of sensation, rashes, abd pain, nausea vomiting, dysuria urinary or fecal incontience

## 2020-01-28 NOTE — ED CDU PROVIDER DISPOSITION NOTE - PATIENT PORTAL LINK FT
You can access the FollowMyHealth Patient Portal offered by [facility] by registering at the following website: [email or web address]. By joining [employer]’s FollowMyHealth portal, you will also be able to view your health information using other applications (apps) compatible with our system.

## 2020-01-28 NOTE — ED CDU PROVIDER INITIAL DAY NOTE - PHYSICAL EXAMINATION
Const: Awake, alert and oriented. In no acute distress. Well appearing.  HEENT: NC/AT. Moist mucous membranes.  Eyes: No scleral icterus. EOMI.  Neck:. Soft and supple. Full ROM without pain.  Cardiac: +S1/S2. No murmurs. Peripheral pulses 2+ and symmetric. No LE edema.  Resp: Speaking in full sentences. No evidence of respiratory distress. No wheezes, rales or rhonchi.  Abd: Soft, non-tender, non-distended. Normal bowel sounds in all 4 quadrants. No guarding or rebound.  Back: no midline tenderness of spine on palpation, right parapsinal lumbar tenderness on palpation no bony step offs or deformities on palpation  mSK: FROM in all extremities   Skin: No rashes, abrasions or lacerations.  Lymph: No cervical lymphadenopathy.  Neuro: Awake, alert & oriented x 3. cn ii-xii intact, neurovascualry intact, muscle strength 5/5 x 4 extremities, gait without ataxia, reflexes intact

## 2020-01-28 NOTE — ED CDU PROVIDER DISPOSITION NOTE - NSFOLLOWUPCLINICS_GEN_ALL_ED_FT
[facility]  Neurosurgery/Spine  [address] [place] [address]  Phone: [phone]  Fax:   Follow Up Time: Routine

## 2020-01-28 NOTE — ED CDU PROVIDER SUBSEQUENT DAY NOTE - ATTENDING CONTRIBUTION TO CARE
I performed a face to face history and physical exam of the patient and discussed their management with the resident/ACP. I reviewed the resident/ACP's note and agree with the documented findings and plan of care.    as per PA note

## 2020-01-28 NOTE — PHYSICAL THERAPY INITIAL EVALUATION ADULT - ADDITIONAL COMMENTS
Pt lives alone in a private home but has an aide daily for 3 hours to assist with ADLs. No steps to navigate. Pt was ambulatory PTA with RW. Pt owns RW.

## 2020-01-28 NOTE — ED CDU PROVIDER INITIAL DAY NOTE - ATTENDING CONTRIBUTION TO CARE
I, Thang Dominguez, performed a face to face bedside interview with this patient regarding history of present illness, review of symptoms and relevant past medical, social and family history.  I completed an independent physical examination. I have communicated the patient’s plan of care and disposition with the ACP.  64 year old female presents with low back pain radiatign to R leg since friday. Reports weakness secondary to pain, no numbness, no bowel/bladder dysfunction. Pt placed on Obs as she is unable to ambulate due to pain, for MR, PT, spine consult, pain control  Gen: NAD, well appearing  CV: RRR  Pul: CTA b/l  Abd: Soft, non-distended, non-tender  Neuro: no focal deficits

## 2020-01-28 NOTE — ED CDU PROVIDER DISPOSITION NOTE - CLINICAL COURSE
65 y/o F presented to ED yesterday with c/o LBP radiating down right leg.  CT scan without acute fx. Was having so much pain last night that she was unable to ambulate.  Today pain is well controlled, she was able to get up from the stretcher for me independently.  PT ambulated patient.  MRI unnecessary as patient is neurologically intact, able to ambulate.  Will rx ibuprofen, robaxin, lidoderm patch, f/u Spine Center as necessary. Outpatient PT.

## 2020-01-28 NOTE — ED CDU PROVIDER SUBSEQUENT DAY NOTE - PHYSICAL EXAMINATION
Const: Awake, alert and oriented. In no acute distress. Well appearing.  HEENT: NC/AT. Moist mucous membranes.  Eyes: No scleral icterus. EOMI.  Neck:. Soft and supple. Full ROM without pain.  Cardiac:  +S1/S2. No murmurs. Peripheral pulses 2+ and symmetric. No LE edema.  Resp: Speaking in full sentences. No evidence of respiratory distress. No wheezes, rales or rhonchi.  Abd: Soft, non-tender, non-distended. Normal bowel sounds in all 4 quadrants. No guarding or rebound.  Back: Diffuse lumbar tenderness on palpation, no bony step offs or deformities on palpation  Skin: No rashes, abrasions or lacerations.  Lymph: No cervical lymphadenopathy.  Neuro: Awake, alert & oriented x 3. CN II-XII intact, neurovascularly intact, muscle strength fair, gait without ataxia

## 2020-02-11 NOTE — H&P PST ADULT - NSICDXPROBLEM_GEN_ALL_CORE_FT
PROBLEM DIAGNOSES  Problem: Menopausal and perimenopausal disorder  Assessment and Plan: Fractional Dilation and Curettage, Hysteroscopy  Medical clearance  Cardiology clearance    Problem: Need for prophylactic measure  Assessment and Plan:     Problem: Screening for substance abuse  Assessment and Plan: PROBLEM DIAGNOSES  Problem: Menopausal and perimenopausal disorder  Assessment and Plan: Fractional Dilation and Curettage, Hysteroscopy  Medical clearance  Cardiology clearance    Problem: Need for prophylactic measure  Assessment and Plan: Moderate risk.  Surgical team to evaluate need for Pharmacologic VTE Prophylaxis    Problem: Screening for substance abuse  Assessment and Plan: Opioid screening tool score =9.  High risk for potential future abuse Bcc Infiltrative Histology Text: There were numerous aggregates of basaloid cells demonstrating an infiltrative pattern.

## 2020-02-12 ENCOUNTER — APPOINTMENT (OUTPATIENT)
Dept: NEUROSURGERY | Facility: CLINIC | Age: 65
End: 2020-02-12
Payer: MEDICAID

## 2020-02-12 VITALS
SYSTOLIC BLOOD PRESSURE: 122 MMHG | HEART RATE: 96 BPM | DIASTOLIC BLOOD PRESSURE: 79 MMHG | BODY MASS INDEX: 42.69 KG/M2 | WEIGHT: 232 LBS | HEIGHT: 62 IN

## 2020-02-12 DIAGNOSIS — M54.16 RADICULOPATHY, LUMBAR REGION: ICD-10-CM

## 2020-02-12 PROCEDURE — 99203 OFFICE O/P NEW LOW 30 MIN: CPT

## 2020-02-12 RX ORDER — NAPROXEN 500 MG/1
500 TABLET ORAL
Qty: 30 | Refills: 2 | Status: ACTIVE | COMMUNITY
Start: 2020-02-12 | End: 1900-01-01

## 2020-02-12 RX ORDER — METHOCARBAMOL 750 MG/1
750 TABLET, FILM COATED ORAL
Qty: 30 | Refills: 0 | Status: ACTIVE | COMMUNITY
Start: 2020-02-12 | End: 1900-01-01

## 2020-02-12 NOTE — REASON FOR VISIT
[New Patient Visit] : a new patient visit [Referred By: _________] : Patient was referred by BLAINE [FreeTextEntry1] : right lumbar radiculopathy

## 2020-02-12 NOTE — HISTORY OF PRESENT ILLNESS
[> 3 months] : more  than 3 months [FreeTextEntry1] : lower back and right lower back pain  [de-identified] : MIRNA MARROQUIN is a 64 year old female presents for initial neurosurgical evaluation of a right lumbar radiculopathy.  PMH includes DM, HTN,schizoaffective disorder.  Patient states she has long standing lower back pain. No recent trauma or injury.  Patient reports severe lower back and R > L LE pain which prompted a visit to [facility] ER 1/28/2020. She reports pain that starts at the waist line with intermittent radiation to right buttock/posterior thigh/calf and foot.  Intermittent LLE pain. LB= LE pain.  RLE > LLE pain. Pain intensity 6/10.  Denies any saddle anesthesia, urinary or bowel incontinence.  She is ambulating with the assistance of a rolling walker.  She states there is no aggravating or alleviating factors.  She states she has had physical therapy and responded well.  No pain management.   She has not started the medications ordered from the pharmacy as of yet. \par  [Pain] : pain [Weakness] : weakness [Numbness] : numbness [Bending] : worsened by bending [Lifting] : worsened by lifting [Recumbency] : relieved by recumbency [Rest] : relieved by rest [Ataxia] : no ataxia [Incontinence] : no incontinence [Loss of Dexterity] : good dexterity [Urinary Ret.] : no urinary retention

## 2020-02-12 NOTE — PHYSICAL EXAM
[General Appearance - In No Acute Distress] : in no acute distress [General Appearance - Well Nourished] : well nourished [General Appearance - Alert] : alert [Impaired Insight] : insight and judgment were intact [Oriented To Time, Place, And Person] : oriented to person, place, and time [General Appearance - Well Developed] : well developed [Mood] : the mood was normal [Affect] : the affect was normal [Person] : oriented to person [Place] : oriented to place [Time] : oriented to time [Short Term Intact] : short term memory intact [Concentration Intact] : normal concentrating ability [Fluency] : fluency intact [Comprehension] : comprehension intact [Cranial Nerves Optic (II)] : visual acuity intact bilaterally,  pupils equal round and reactive to light [Cranial Nerves Facial (VII)] : face symmetrical [Cranial Nerves Trigeminal (V)] : facial sensation intact symmetrically [Cranial Nerves Oculomotor (III)] : extraocular motion intact [Cranial Nerves Hypoglossal (XII)] : there was no tongue deviation with protrusion [Cranial Nerves Glossopharyngeal (IX)] : tongue and palate midline [Cranial Nerves Accessory (XI - Cranial And Spinal)] : head turning and shoulder shrug symmetric [Over the Past 2 Weeks, Have You Felt Down, Depressed, or Hopeless?] : 1.) Over the past 2 weeks, have you felt down, depressed, or hopeless? No [Over the Past 2 Weeks, Have You Felt Little Interest or Pleasure Doing Things?] : 2.) Over the past 2 weeks, have you felt little interest or pleasure doing things? No

## 2020-02-12 NOTE — ASSESSMENT
[FreeTextEntry1] : Ms. Spears presents with above history and imaging.  She is neurologically intact.  She reports moderate to severe lower back pain with radiation to RLE > LLE.  She will start a course of physical therapy for LE strengthening.  She will start Naproxen and Methocarbamol 750 mg during the course of acute LBP.  She should follow up in 4-6 weeks to assess her recovery.  Patient knows to call the office if there are any new or worsening symptoms.\par

## 2020-02-12 NOTE — DATA REVIEWED
[de-identified] : There is no acute displaced fracture or traumatic malalignment. There is  maintenance of the lumbar lordosis. Vertebral heights and intervertebral  disc spaces are maintained. There are 5 nonrib-bearing lumbar type  vertebral. There are no suspicious focal lytic or sclerotic bone lesions.  There is no significant bony canal or neuroforaminal stenosis.   There are no paraspinal masses.   IMPRESSION:   No acute displaced fracture or traumatic malalignment.  No explanation for low back pain on this CT.

## 2020-03-17 NOTE — PROVIDER CONTACT NOTE (OTHER) - ASSESSMENT
Patient doesn't want to come in and do a urine only in fear of coronavirus. Patient just wants antibiotics. Please advise. Ok to call and lm [phone] Patient advised about making a E visit   Pt required supervision for bed mobility and ambulating 1 step with RW but was limited by increased pain level and c/o dizziness. Increased assist needed to return to bed 2*2 pain.

## 2020-05-26 ENCOUNTER — APPOINTMENT (OUTPATIENT)
Dept: GYNECOLOGIC ONCOLOGY | Facility: CLINIC | Age: 65
End: 2020-05-26
Payer: MEDICAID

## 2020-05-26 VITALS — WEIGHT: 231 LBS | BODY MASS INDEX: 42.51 KG/M2 | HEIGHT: 62 IN

## 2020-05-26 PROCEDURE — 99214 OFFICE O/P EST MOD 30 MIN: CPT

## 2020-05-26 NOTE — PHYSICAL EXAM
[Abnormal] : General appearance: Abnormal [Absent] : Adnexa(ae): Absent [Normal] : Anus and perineum: Normal sphincter tone, no masses, no prolapse. [FreeTextEntry1] : shorter in statue, with poor dentition [Ambulatory and capable of all self care but unable to carry out any work activities] : Status 2- Ambulatory and capable of all self care but unable to carry out any work activities. Up and about more than 50% of waking hours

## 2020-05-26 NOTE — HISTORY OF PRESENT ILLNESS
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 She declined adjuvant treatment with radiation/chemotherapy. She presents for surveillance. No vaginal bleeding or discharge. She reports resolution of abdominal discomfort. She reports history of polyps, but has not had a recent colonoscopy. She needs a GI referral. She also reports burping and reflux.

## 2020-05-26 NOTE — ASSESSMENT
[FreeTextEntry1] : Pt is a 63 yo Stage Ia with high grade serous endometrial cancer confined to a polyp/endometrium s/p TRH, BSO, staging with no residual disease on pathology. No evidence of disease today. Abdominal bloating and occasional discomfort which have improved since last time but she has not had a colonoscopy. Referral for GI. If negative will get CT abdomen/pelvis.

## 2020-05-29 DIAGNOSIS — Z87.440 PERSONAL HISTORY OF URINARY (TRACT) INFECTIONS: ICD-10-CM

## 2020-05-29 LAB — BACTERIA UR CULT: ABNORMAL

## 2020-05-29 RX ORDER — SULFAMETHOXAZOLE AND TRIMETHOPRIM 800; 160 MG/1; MG/1
800-160 TABLET ORAL
Qty: 14 | Refills: 0 | Status: ACTIVE | COMMUNITY
Start: 2020-05-29 | End: 1900-01-01

## 2020-11-19 ENCOUNTER — APPOINTMENT (OUTPATIENT)
Dept: GYNECOLOGIC ONCOLOGY | Facility: CLINIC | Age: 65
End: 2020-11-19

## 2020-12-02 NOTE — PATIENT PROFILE ADULT - NSPROPASSIVESMOKEEXPOSURE_GEN_A_NUR
12/5/20 Patient: Lauro Uribe YOB: 1970 Date of Visit: 12/2/2020 Katerina Yuen MD 
[id number] [facility] [address] VIA Facsimile: [phone] Dear Katerina Yuen MD, Thank you for referring Mr. Lauro Uribe to [address] [place] [address] for evaluation. My notes for this consultation are attached. If you have questions, please do not hesitate to call me. I look forward to following your patient along with you. Sincerely, Raza Hidalgo MD 
 
 No

## 2020-12-21 PROBLEM — N76.0 BACTERIAL VAGINOSIS: Status: RESOLVED | Noted: 2019-05-16 | Resolved: 2020-12-21

## 2023-03-03 NOTE — ED PROVIDER NOTE - CPE EDP PSYCH NORM
Guillermo Hrust from [facility] called to report that patient declined therapy the last two days because she is ill and not getting out of bed   She advised a Covid test 
Left message to call is she is feeling poorly 
normal...

## 2023-04-24 NOTE — PATIENT PROFILE ADULT. - HEALTHCARE INFORMATION NEEDED, PROFILE
Detail Level: Zone
Photo Preface (Leave Blank If You Do Not Want): Photographs were obtained today
none

## 2024-12-31 NOTE — ED PROVIDER NOTE - CPE EDP SKIN NORM
Patient is requesting an ambulatory and  insurance referral for the following specialty:      Test Name / Order Name: Cardiology    DX Code:    Procedure code 54630  Date Of Service: 01/09/2025    Location/Facility Name/Address/Phone #: [facility]. Dr JONATHAN LAURENT MD- Phone: [phone]    Location / Facility NPI: [id number]    Best Phone # To Reach The Patient:  [phone]  
normal...

## 2025-02-28 NOTE — ASU PATIENT PROFILE, ADULT - AS SC [facility] M[facility]ILITY
Script was sent post dated to [facility] . Call to April to update her. She did not answer- detailed message left for her asking her to confirm the correct pharmacy.    (4) no limitation